# Patient Record
Sex: FEMALE | Race: ASIAN | NOT HISPANIC OR LATINO | Employment: FULL TIME | URBAN - METROPOLITAN AREA
[De-identification: names, ages, dates, MRNs, and addresses within clinical notes are randomized per-mention and may not be internally consistent; named-entity substitution may affect disease eponyms.]

---

## 2017-12-13 ENCOUNTER — OFFICE VISIT (OUTPATIENT)
Dept: PEDIATRICS | Facility: CLINIC | Age: 27
End: 2017-12-13
Payer: COMMERCIAL

## 2017-12-13 VITALS
OXYGEN SATURATION: 98 % | HEIGHT: 61 IN | SYSTOLIC BLOOD PRESSURE: 96 MMHG | BODY MASS INDEX: 26.68 KG/M2 | WEIGHT: 141.3 LBS | TEMPERATURE: 97.7 F | DIASTOLIC BLOOD PRESSURE: 60 MMHG | HEART RATE: 68 BPM

## 2017-12-13 DIAGNOSIS — Z32.01 POSITIVE PREGNANCY TEST: ICD-10-CM

## 2017-12-13 DIAGNOSIS — N91.2 AMENORRHEA: Primary | ICD-10-CM

## 2017-12-13 LAB — BETA HCG QUAL IFA URINE: POSITIVE

## 2017-12-13 PROCEDURE — 84703 CHORIONIC GONADOTROPIN ASSAY: CPT | Performed by: FAMILY MEDICINE

## 2017-12-13 PROCEDURE — 99202 OFFICE O/P NEW SF 15 MIN: CPT | Performed by: FAMILY MEDICINE

## 2017-12-13 ASSESSMENT — PAIN SCALES - GENERAL: PAINLEVEL: NO PAIN (0)

## 2017-12-13 NOTE — PROGRESS NOTES
SUBJECTIVE:                                                    Jaqui Guzman is a 27 year old female who presents to clinic today for the following health issues:      New patient - Previously seen at [facility]  Confirmation of pregnancy-LMP approximately 10/12/17 and positive home pregnancy test  This is a new patient to this provider, who is a  4 para 3, she is here to confirm pregnancy.    Patient has history of irregular menstrual cycles, LMP-2017.  Has no urinary frequency, abdominal pain, breast fullness or tenderness, abnormal vaginal discharge or bleeding, nausea, vomiting.    'Patient does not have underlying medical problems  She has been taking prenatal vitamins for the past 3 months            Problem list and histories reviewed & adjusted, as indicated.  Additional history: as documented    There is no problem list on file for this patient.    History reviewed. No pertinent surgical history.    Social History   Substance Use Topics     Smoking status: Never Smoker     Smokeless tobacco: Never Used     Alcohol use No     History reviewed. No pertinent family history.      Current Outpatient Prescriptions   Medication Sig Dispense Refill     Prenatal Multivit-Min-Fe-FA (PRENATAL VITAMINS PO) Take 1 chew tab by mouth daily       No Known Allergies  No lab results found.   BP Readings from Last 3 Encounters:   17 96/60    Wt Readings from Last 3 Encounters:   17 141 lb 4.8 oz (64.1 kg)                  Labs reviewed in EPIC          ROS:  C: NEGATIVE for fever, chills, change in weight  R: NEGATIVE for significant cough or SOB  CV: NEGATIVE for chest pain, palpitations or peripheral edema  GI: NEGATIVE for nausea, abdominal pain, heartburn, or change in bowel habits  : as above  ENDOCRINE: NEGATIVE for temperature intolerance, skin/hair changes  PSYCHIATRIC: NEGATIVE for changes in mood or affect    OBJECTIVE:     BP 96/60 (BP Location: Right arm, Patient Position: Sitting,  "Cuff Size: Adult Regular)  Pulse 68  Temp 97.7  F (36.5  C) (Oral)  Ht 5' 1\" (1.549 m)  Wt 141 lb 4.8 oz (64.1 kg)  LMP 10/12/2017 (Within Weeks)  SpO2 98%  BMI 26.7 kg/m2  Body mass index is 26.7 kg/(m^2).  GENERAL: healthy, alert and no distress  NECK: no adenopathy, no asymmetry, masses, or scars and thyroid normal to palpation  RESP: lungs clear to auscultation - no rales, rhonchi or wheezes  CV: regular rate and rhythm, normal S1 S2, no S3 or S4, no murmur, click or rub, no peripheral edema and peripheral pulses strong  ABDOMEN: soft, nontender, no hepatosplenomegaly, no masses and bowel sounds normal  MS: no gross musculoskeletal defects noted, no edema  PSYCH: mentation appears normal, affect normal/bright    Diagnostic Test Results:  Results for orders placed or performed in visit on 12/13/17 (from the past 24 hour(s))   Beta HCG qual IFA urine   Result Value Ref Range    Beta HCG Qual IFA Urine Positive (A) NEG^Negative          ASSESSMENT/PLAN:             1. Amenorrhea  Results for orders placed or performed in visit on 12/13/17   Beta HCG qual IFA urine   Result Value Ref Range    Beta HCG Qual IFA Urine Positive (A) NEG^Negative          Reviewed positive pregnancy results with patient  Recommended to continue with prenatal vitamins daily, she will start seeking will be here for ongoing prenatal care given brief prenatal counseling.  Patient verbalised understanding and is agreeable to the plan.      - Beta HCG qual IFA urine - [facility]  - Beta HCG qual IFA urine    2. Positive pregnancy test  as above    - OB/GYN REFERRAL    Chart documentation done in part with Dragon Voice recognition Software. Although reviewed after completion, some word and grammatical error may remain.    See Patient Instructions    Rose Batista MD  [facility]  "

## 2017-12-13 NOTE — NURSING NOTE
"Chief Complaint   Patient presents with     New Patient     Confirmation Of Pregnancy       Initial BP 96/60 (BP Location: Right arm, Patient Position: Sitting, Cuff Size: Adult Regular)  Pulse 68  Temp 97.7  F (36.5  C) (Oral)  Ht 5' 1\" (1.549 m)  Wt 141 lb 4.8 oz (64.1 kg)  LMP 10/12/2017 (Within Weeks)  SpO2 98%  BMI 26.7 kg/m2 Estimated body mass index is 26.7 kg/(m^2) as calculated from the following:    Height as of this encounter: 5' 1\" (1.549 m).    Weight as of this encounter: 141 lb 4.8 oz (64.1 kg).  BP completed using cuff size: regular  Clifford Koo, ARTURO    "

## 2017-12-13 NOTE — MR AVS SNAPSHOT
After Visit Summary   12/13/2017    Jaqui Guzman    MRN: [id number]           Patient Information     Date Of Birth          1990        Visit Information        Provider Department      12/13/2017 11:10 AM Rose Batista MD [facility]        Today's Diagnoses     Amenorrhea    -  1    Positive pregnancy test          Care Instructions    Schedule for OB consult in 2-3 weeks          Follow-ups after your visit        Additional Services     OB/GYN REFERRAL       Your provider has referred you to:  [facility] [facility] [phone]   [email or web address]     Please be aware that coverage of these services is subject to the terms and limitations of your health insurance plan.  Call member services at your health plan with any benefit or coverage questions.      Please bring the following with you to your appointment:    (1) Any X-Rays, CTs or MRIs which have been performed.  Contact the facility where they were done to arrange for  prior to your scheduled appointment.   (2) List of current medications   (3) This referral request   (4) Any documents/labs given to you for this referral                  Who to contact     If you have questions or need follow up information about today's clinic visit or your schedule please contact [facility] [[email or web address]] directly at [phone].  Normal or non-critical lab and imaging results will be communicated to you by MyChart, letter or phone within 4 business days after the clinic has received the results. If you do not hear from us within 7 days, please contact the clinic through MyChart or phone. If you have a critical or abnormal lab result, we will notify you by phone as soon as possible.  Submit refill requests through [employer] or call your pharmacy and they will forward the refill request to us. Please allow 3 business days for your refill to be completed.     "      Additional Information About Your Visit        [employer] Information     Verified Identity Pass is an electronic gateway that provides easy, online access to your medical records. With Verified Identity Pass, you can request a clinic appointment, read your test results, renew a prescription or communicate with your care team.     To sign up for Verified Identity Pass visit the website at [email or web address]   You will be asked to enter the access code listed below, as well as some personal information. Please follow the directions to create your username and password.     Your access code is: [email or web address]  Expires: 3/7/2018 11:30 AM     Your access code will  in 90 days. If you need help or a new code, please contact your [facility] or call [phone] for assistance.        Care EveryWhere ID     This is your Care EveryWhere ID. This could be used by other organizations to access your [facility] medical records  [id number]        Your Vitals Were     Pulse Temperature Height Last Period Pulse Oximetry BMI (Body Mass Index)    68 97.7  F (36.5  C) (Oral) 5' 1\" (1.549 m) 10/12/2017 (Within Weeks) 98% 26.7 kg/m2       Blood Pressure from Last 3 Encounters:   17 96/60    Weight from Last 3 Encounters:   17 141 lb 4.8 oz (64.1 kg)              We Performed the Following     Beta HCG qual IFA urine - [facility] and [place]     Beta HCG qual IFA urine     OB/GYN REFERRAL        Primary Care Provider Fax #    Physician No Ref-Primary [phone]       No address on file        Equal Access to Services     MILVIA GRAHAM : Hadii aad ku hadasho Soomaali, waaxda luqadaha, qaybta kaalmada adeegyada, waxay jamie humphreys . So [facility] [phone].    ATENCIÓN: Si habla español, tiene a pineda disposición servicios gratuitos de asistencia lingüística. Llame al [phone].    We comply with applicable federal civil rights laws and [place] laws. We do not discriminate on the basis of race, color, " national origin, age, disability, sex, sexual orientation, or gender identity.            Thank you!     Thank you for choosing [facility]  for your care. Our goal is always to provide you with excellent care. Hearing back from our patients is one way we can continue to improve our services. Please take a few minutes to complete the written survey that you may receive in the mail after your visit with us. Thank you!             Your Updated Medication List - Protect others around you: Learn how to safely use, store and throw away your medicines at www.disposemymeds.org.          This list is accurate as of: 12/13/17 11:59 AM.  Always use your most recent med list.                   Brand Name Dispense Instructions for use Diagnosis    PRENATAL VITAMINS PO      Take 1 chew tab by mouth daily

## 2018-02-11 ENCOUNTER — HEALTH MAINTENANCE LETTER (OUTPATIENT)
Age: 28
End: 2018-02-11

## 2018-02-15 ENCOUNTER — RADIANT APPOINTMENT (OUTPATIENT)
Dept: ULTRASOUND IMAGING | Facility: CLINIC | Age: 28
End: 2018-02-15
Attending: OBSTETRICS & GYNECOLOGY
Payer: COMMERCIAL

## 2018-02-15 ENCOUNTER — PRENATAL OFFICE VISIT (OUTPATIENT)
Dept: OBGYN | Facility: CLINIC | Age: 28
End: 2018-02-15
Payer: COMMERCIAL

## 2018-02-15 VITALS
TEMPERATURE: 98.6 F | SYSTOLIC BLOOD PRESSURE: 96 MMHG | DIASTOLIC BLOOD PRESSURE: 60 MMHG | HEART RATE: 75 BPM | WEIGHT: 137 LBS | BODY MASS INDEX: 25.86 KG/M2 | HEIGHT: 61 IN

## 2018-02-15 DIAGNOSIS — O09.92 HIGH-RISK PREGNANCY IN SECOND TRIMESTER: ICD-10-CM

## 2018-02-15 DIAGNOSIS — Z12.4 PAP SMEAR FOR CERVICAL CANCER SCREENING: ICD-10-CM

## 2018-02-15 DIAGNOSIS — Z86.718 PERSONAL HISTORY OF DVT (DEEP VEIN THROMBOSIS): Primary | ICD-10-CM

## 2018-02-15 PROCEDURE — G0145 SCR C/V CYTO,THINLAYER,RESCR: HCPCS | Performed by: OBSTETRICS & GYNECOLOGY

## 2018-02-15 PROCEDURE — 76801 OB US < 14 WKS SINGLE FETUS: CPT | Performed by: STUDENT IN AN ORGANIZED HEALTH CARE EDUCATION/TRAINING PROGRAM

## 2018-02-15 PROCEDURE — 99203 OFFICE O/P NEW LOW 30 MIN: CPT | Performed by: OBSTETRICS & GYNECOLOGY

## 2018-02-15 NOTE — MR AVS SNAPSHOT
After Visit Summary   2/15/2018    Jaqui Guzman    MRN: [id number]           Patient Information     Date Of Birth          1990        Visit Information        Provider Department      2/15/2018 10:45 AM Leonardo Echevarria MD [facility]        Today's Diagnoses     Personal history of DVT (deep vein thrombosis)    -  1    High-risk pregnancy in second trimester        Pap smear for cervical cancer screening          Care Instructions    If you have any questions regarding your visit, Please contact your care team.    Women s Health  TELEPHONE NUMBER   HANNAH Higgins-    Norberto Mahan-Medical Assistant      [facility]  [address] [place] [address] [phone] ask for [facility]    Imaging [phone]    [facility]  [address] [place] [address] [address]  [place], [place] [address] [phone]    [address]  [place] [address] [phone] ask for [facility]  Imaging [phone]     Urgent Care locations:    [facility] Monday-Friday  5 pm - 9 pm  Saturday and Sunday   9 am - 5 pm    Monday-Friday   11 am - 9 pm  Saturday and Sunday   9 am - 5 pm   [phone]       If you need a medication refill, please contact your pharmacy. Please allow 3 business days for your refill to be completed.  As always, Thank you for trusting us with your healthcare needs!            Follow-ups after your visit        Your next 10 appointments already scheduled     Feb 15, 2018 12:00 PM CST   US OB < 14 WEEKS SINGLE with MGUS1, MG  TECH   [facility]    [address] [facility] [address] [phone]           Please bring a list of your medicines (including vitamins, minerals and over-the-counter drugs). Also, tell your doctor about any allergies you may have.  "Wear comfortable clothes and leave your valuables at home.  If you re less than 20 weeks drink four 8-ounce glasses of fluid an hour before your exam. If you need to empty your bladder before your exam, try to release only a little urine. Then, drink another glass of fluid.  You may have up to two family members in the exam room. If you bring a small child, an adult must be there to care for him or her.  Please call the Imaging Department at your exam site with any questions.              Future tests that were ordered for you today     Open Future Orders        Priority Expected Expires Ordered    US OB < 14 Weeks Single Routine 2/15/2018 4/1/2018 2/15/2018            Who to contact     If you have questions or need follow up information about today's clinic visit or your schedule please contact [facility] directly at [phone].  Normal or non-critical lab and imaging results will be communicated to you by MyChart, letter or phone within 4 business days after the clinic has received the results. If you do not hear from us within 7 days, please contact the clinic through Fl[employer] or phone. If you have a critical or abnormal lab result, we will notify you by phone as soon as possible.  Submit refill requests through [email or web address] or call your pharmacy and they will forward the refill request to us. Please allow 3 business days for your refill to be completed.          Additional Information About Your Visit        MyChart Information     [email or web address] lets you send messages to your doctor, view your test results, renew your prescriptions, schedule appointments and more. To sign up, go to [email or web address] . Click on \"Log in\" on the left side of the screen, which will take you to the Welcome page. Then click on \"Sign up Now\" on the right side of the page.     You will be asked to enter the access code listed below, as well as some personal information. Please follow the directions to create your " "username and password.     Your access code is: [email or web address]  Expires: 3/7/2018 11:30 AM     Your access code will  in 90 days. If you need help or a new code, please call your [facility] clinic or [phone].        Care EveryWhere ID     This is your Care EveryWhere ID. This could be used by other organizations to access your [place] [facility] medical records  [id number]        Your Vitals Were     Pulse Temperature Height Last Period Breastfeeding? BMI (Body Mass Index)    75 98.6  F (37  C) (Oral) 5' 1\" (1.549 m) 10/12/2017 (Within Weeks) No 25.89 kg/m2       Blood Pressure from Last 3 Encounters:   02/15/18 96/60   17 96/60    Weight from Last 3 Encounters:   02/15/18 137 lb (62.1 kg)   17 141 lb 4.8 oz (64.1 kg)              We Performed the Following     ABO/Rh type and screen     Anti Treponema     CBC with platelets differential     Hepatitis B surface antigen     HIV Antigen Antibody Combo     Pap imaged thin layer screen reflex to HPV if ASCUS - recommend age 25 - 29     Rubella Antibody IgG Quantitative     Urine Culture Aerobic Bacterial        Primary Care Provider Office Phone # Fax #    Rose Batista [phone] [address]  [facility] [address]        Equal Access to Services     ASHANTI GRAHAM : Hadii aad ku hadasho Soomaali, waaxda luqadaha, qaybta kaalmada adeegyada, pasha humphreys . So [facility] [place] [facility] [phone].    ATENCIÓN: Si habla español, tiene a pineda disposición servicios gratuitos de asistencia lingüística. Llame al [phone].    We comply with applicable federal civil rights laws and [place] laws. We do not discriminate on the basis of race, color, national origin, age, disability, sex, sexual orientation, or gender identity.            Thank you!     Thank you for choosing [facility]  for your care. Our goal is always to provide you with excellent care. Hearing back from our patients is one way we can " continue to improve our services. Please take a few minutes to complete the written survey that you may receive in the mail after your visit with us. Thank you!             Your Updated Medication List - Protect others around you: Learn how to safely use, store and throw away your medicines at www.disposemymeds.org.          This list is accurate as of 2/15/18 11:57 AM.  Always use your most recent med list.                   Brand Name Dispense Instructions for use Diagnosis    PRENATAL VITAMINS PO      Take 1 chew tab by mouth daily

## 2018-02-15 NOTE — NURSING NOTE
"Chief Complaint   Patient presents with     Prenatal Care     1st initial OB visit       Initial BP 96/60 (BP Location: Right arm, Patient Position: Chair, Cuff Size: Adult Regular)  Pulse 75  Temp 98.6  F (37  C) (Oral)  Ht 5' 1\" (1.549 m)  Wt 137 lb (62.1 kg)  LMP 10/12/2017 (Within Weeks)  Breastfeeding? No  BMI 25.89 kg/m2 Estimated body mass index is 25.89 kg/(m^2) as calculated from the following:    Height as of this encounter: 5' 1\" (1.549 m).    Weight as of this encounter: 137 lb (62.1 kg).  Medication Reconciliation: complete   Vinita Baez CMA      "

## 2018-02-15 NOTE — LETTER
February 23, 2018      Jaqui Guzman  [address]  [place] [address]    Dear ,      I am happy to inform you that your recent cervical cancer screening test (PAP smear) was normal.      Preventative screenings such as this help to ensure your health for years to come. You should repeat a pap smear in 3 years, unless otherwise directed.      You will still need to return to the clinic every year for your annual exam and other preventive tests.     Please contact the clinic at [phone] if you have further questions.       Sincerely,      Leonardo Echevarria MD/[facility]

## 2018-02-15 NOTE — PROGRESS NOTES
INITIAL OB ASSESSMENT............................................Date: 2/15/2018                            ---------------------    Name: Jaqui Guzman.......................................................................Plan Number: [id number]    Age: 27 year old   : 1990  Phone: [phone] (home)   Address: [address] [place] [address]    Marital Status:    Race/Ethnicity:   Occupation:     Partner's Name:  Rob Potter, Partner's Occupation:  Senior health care in [place]     OB Physician: Leonardo Echevarria       LMP:  Patient's LMP from OB Dating Form:  Patient's last menstrual period was 10/12/2017 (within weeks).  Regular menses? yes  Menses every month.   Length of menses: 7 days    Obstetrical History  ===================  Obstetric History       T3      L3     SAB0   TAB0   Ectopic0   Multiple0   Live Births3       # Outcome Date GA Lbr Christoph/2nd Weight Sex Delivery Anes PTL Lv   4 Current            3 Term         DAVID   2 Term         DAVID   1 Term         DAVID          Past Medical History:  Past Medical History:   Diagnosis Date     DVT (deep vein thrombosis) in pregnancy (H) 2015    right leg       Past Surgical History:  Past Surgical History:   Procedure Laterality Date     APPENDECTOMY         Current Outpatient Prescriptions   Medication Sig Dispense Refill     Prenatal Multivit-Min-Fe-FA (PRENATAL VITAMINS PO) Take 1 chew tab by mouth daily         No Known Allergies    Social History     Social History     Marital status:      Spouse name: N/A     Number of children: N/A     Years of education: N/A     Occupational History     Not on file.     Social History Main Topics     Smoking status: Never Smoker     Smokeless tobacco: Never Used     Alcohol use No     Drug use: No     Sexual activity: Yes     Partners: Male     Other Topics Concern     Parent/Sibling W/ Cabg, Mi Or Angioplasty  "Before 65f 55m? No     Social History Narrative     , Children  No substance abuse, environmental exposures, mental health risks and No financial concerns,pets. Partner support.       Family History   Problem Relation Age of Onset     Breast Cancer No family hx of      Colon Cancer No family hx of      Hypertension No family hx of      DIABETES No family hx of        Past Medical History of Father of Baby:   No significant medical history     No known genetic disease in patient's 1st or 2nd degree relatives  No known genetic diseases in the FOB's 1st or 2nd degree relatives    REVIEW OF SYMPTOMS:   History Since Last Menstrual Period:    nausea, vomiting and breast tenderness     PHYSICAL EXAM:  BP 96/60 (BP Location: Right arm, Patient Position: Chair, Cuff Size: Adult Regular)  Pulse 75  Temp 98.6  F (37  C) (Oral)  Ht 5' 1\" (1.549 m)  Wt 137 lb (62.1 kg)  LMP 10/12/2017 (Within Weeks)  Breastfeeding? No  BMI 25.89 kg/m2  General:  WNWD female, NAD  Oriented:  X 3  Alert  PSYCH:  mentation appears normal., affect and mood normal  HEENT:  NC/AT, EOMI  NECK:  Neck supple. No adenopathy. Thyroid symmetric, normal size,, Carotids without bruits.  HEART:  RRR  LUNGS:  Clear to auscultation.  Good respiratory effort  BREASTS: declined   ABDOMEN: Benign, Soft, flat, non-tender, No masses, organomegaly and Bowel sounds normoactive FHT's heard  VULVA: no masses or lesions seen  BUS:  Bartholin's, Urethra, Doral's normal  VAGINA:  No masses or lesions seen.   CERVIX:  Parous, closed, mobile, no discharge  UTERUS:  Normal shape, position and consistency and Nontender; 14-16 week size  ADNEXA:  No masses palpated, non-tender  EXTREMITIES:No cyanosis, clubbing, warm and well perfused and No edema   GAIT: normal including tandem walk, heel and toe walk.        Assessment:   IUP at 18 weeks, 14-16 week size     Plan:  Schedule ultrasound for the size less than dates.  She also is not sure on the LMP.    She has " history of DVT in past pregnancy in left leg.  She was non compliant with Lovenox during the pregnancy.   Recommend to see [facility] for this.    She is also planning on going to [email or web address] next month.  She should visit the [facility] for [facility].  I had attempted to discuss this with her further, including Zeka virus, but I don't feel she understood what I was attempting to say.  [facility] are given to her.  She is to call and set up appointment.    Options for  testing for chromosomal and birth defects were discussed with the patient.  Diagnostic tests include CVS and Amniocentesis.  We discussed that these tests are definitive but invasive and do carry a risk of fetal loss.    Screening tests include nuchal translucency/blood marker testing in the first trimester and quad screening in the second trimester.  We discussed that these are screening tests and not diagnostic tests and that false positives and negatives are a distinct possibility.  The patient will think about and decide.  We discussed physician coverage, tertiary support, diet, exercise, weight gain, schedule of visits, routine and indicated ultrasounds, and childbirth education.   Labs done today  RTC 4 weeks    Leonardo Echevarria MD

## 2018-02-15 NOTE — PATIENT INSTRUCTIONS
If you have any questions regarding your visit, Please contact your care team.    Women s Health  TELEPHONE NUMBER   HANNAH Higgins-    Norberto Mahan-Medical Assistant      [facility]  [address] [place], [place] [address] [place] [address]  [phone] ask for [facility]    Imaging [phone]    [facility]  [address] [place] [address]  [phone]    [facility]  [address]  [place] [address]  [phone] ask for [facility]  Imaging [phone]     Urgent Care locations:    [facility] Monday-Friday  5 pm - 9 pm  Saturday and Sunday   9 am - 5 pm    Monday-Friday   11 am - 9 pm  Saturday and Sunday   9 am - 5 pm   [phone]       If you need a medication refill, please contact your pharmacy. Please allow 3 business days for your refill to be completed.  As always, Thank you for trusting us with your healthcare needs!

## 2018-02-20 LAB
COPATH REPORT: NORMAL
PAP: NORMAL

## 2018-02-22 ENCOUNTER — TELEPHONE (OUTPATIENT)
Dept: OBGYN | Facility: CLINIC | Age: 28
End: 2018-02-22

## 2018-02-22 DIAGNOSIS — Z86.718 PERSONAL HISTORY OF DVT (DEEP VEIN THROMBOSIS): Primary | ICD-10-CM

## 2018-02-22 DIAGNOSIS — O09.90 HIGH-RISK PREGNANCY, UNSPECIFIED TRIMESTER: ICD-10-CM

## 2018-02-22 NOTE — TELEPHONE ENCOUNTER
MFM RN called stating they had an appt held for patient on 02-26-18 and have tried 3 times to contact patient to schedule an appt. Patient has not returned their calls. [facility] does recommend patient be put on Lovenox. MFM RN wanted Dr. Echevarria to know that SHAYLEE would be happy to see patient if she calls back to schedule. Explained patient's next OB appt is on 03-27-18 with Dr. Salinas.     Attempted to reach patient on cell phone. Left messages on both cell & home phone asking pt to call back to clinic about a message that we are trying to schedule for her. Left clinic call back phone number and RN hours.     Will route to Dr. Echevarria as an update. Bianca Fsiher RN, BAN

## 2018-02-23 NOTE — TELEPHONE ENCOUNTER
Wale, Leonardo Bueno MD   [facility] Patient Care 21 hours ago (5:16 PM)                 Prescription sent to [facility] pharmacy.     Patient is to start, per M.  (Routing comment)               Phone call to patient it was very difficult to understand pt. Her voice was very muffled in the phone and could only hear parts of conversation. Requested several times to have patient speak clearly into the phone. Explained [facility] is attempting to reach her to schedule appt per Dr. Echevarria's referral orders. Gave patient phone number and she will call. Patient is not currently on Lovenox and has not been since 2016. Explained per [facility] & Dr. Echevarria patient needs to take med. Verified pharmacy. Sent per Dr. Echevarria's orders.  Patient stated she remembered how to give herself injections and will start med. Bianca Fishre RN, BAN

## 2018-04-06 ENCOUNTER — PRENATAL OFFICE VISIT (OUTPATIENT)
Dept: OBGYN | Facility: CLINIC | Age: 28
End: 2018-04-06
Payer: COMMERCIAL

## 2018-04-06 ENCOUNTER — MEDICAL CORRESPONDENCE (OUTPATIENT)
Dept: HEALTH INFORMATION MANAGEMENT | Facility: CLINIC | Age: 28
End: 2018-04-06

## 2018-04-06 ENCOUNTER — RADIANT APPOINTMENT (OUTPATIENT)
Dept: ULTRASOUND IMAGING | Facility: CLINIC | Age: 28
End: 2018-04-06
Attending: OBSTETRICS & GYNECOLOGY
Payer: COMMERCIAL

## 2018-04-06 VITALS
OXYGEN SATURATION: 97 % | HEART RATE: 69 BPM | WEIGHT: 144.9 LBS | DIASTOLIC BLOOD PRESSURE: 59 MMHG | BODY MASS INDEX: 27.38 KG/M2 | SYSTOLIC BLOOD PRESSURE: 91 MMHG

## 2018-04-06 DIAGNOSIS — Z34.82 ENCOUNTER FOR SUPERVISION OF OTHER NORMAL PREGNANCY, SECOND TRIMESTER: Primary | ICD-10-CM

## 2018-04-06 DIAGNOSIS — O09.92 HIGH-RISK PREGNANCY IN SECOND TRIMESTER: ICD-10-CM

## 2018-04-06 DIAGNOSIS — Z86.718 PERSONAL HISTORY OF DVT (DEEP VEIN THROMBOSIS): ICD-10-CM

## 2018-04-06 DIAGNOSIS — O09.899 SUPERVISION OF OTHER HIGH RISK PREGNANCY, ANTEPARTUM: ICD-10-CM

## 2018-04-06 DIAGNOSIS — O09.899 SUPERVISION OF OTHER HIGH RISK PREGNANCY, ANTEPARTUM: Primary | ICD-10-CM

## 2018-04-06 LAB
ABO + RH BLD: NORMAL
ABO + RH BLD: NORMAL
BACTERIA SPEC CULT: NO GROWTH
BASOPHILS # BLD AUTO: 0 10E9/L (ref 0–0.2)
BASOPHILS NFR BLD AUTO: 0.3 %
BLD GP AB SCN SERPL QL: NORMAL
BLOOD BANK CMNT PATIENT-IMP: NORMAL
DIFFERENTIAL METHOD BLD: ABNORMAL
EOSINOPHIL # BLD AUTO: 0.1 10E9/L (ref 0–0.7)
EOSINOPHIL NFR BLD AUTO: 0.7 %
ERYTHROCYTE [DISTWIDTH] IN BLOOD BY AUTOMATED COUNT: 13.1 % (ref 10–15)
HCT VFR BLD AUTO: 34.7 % (ref 35–47)
HGB BLD-MCNC: 11.3 G/DL (ref 11.7–15.7)
IMM GRANULOCYTES # BLD: 0.1 10E9/L (ref 0–0.4)
IMM GRANULOCYTES NFR BLD: 1.2 %
LYMPHOCYTES # BLD AUTO: 2 10E9/L (ref 0.8–5.3)
LYMPHOCYTES NFR BLD AUTO: 17.6 %
MCH RBC QN AUTO: 32.4 PG (ref 26.5–33)
MCHC RBC AUTO-ENTMCNC: 32.6 G/DL (ref 31.5–36.5)
MCV RBC AUTO: 99 FL (ref 78–100)
MONOCYTES # BLD AUTO: 0.7 10E9/L (ref 0–1.3)
MONOCYTES NFR BLD AUTO: 5.7 %
NEUTROPHILS # BLD AUTO: 8.6 10E9/L (ref 1.6–8.3)
NEUTROPHILS NFR BLD AUTO: 74.5 %
PLATELET # BLD AUTO: 236 10E9/L (ref 150–450)
RADIOLOGIST FLAGS: NORMAL
RBC # BLD AUTO: 3.49 10E12/L (ref 3.8–5.2)
SPECIMEN EXP DATE BLD: NORMAL
SPECIMEN SOURCE: NORMAL
WBC # BLD AUTO: 11.6 10E9/L (ref 4–11)

## 2018-04-06 PROCEDURE — 85025 COMPLETE CBC W/AUTO DIFF WBC: CPT | Performed by: OBSTETRICS & GYNECOLOGY

## 2018-04-06 PROCEDURE — 86780 TREPONEMA PALLIDUM: CPT | Performed by: OBSTETRICS & GYNECOLOGY

## 2018-04-06 PROCEDURE — 87389 HIV-1 AG W/HIV-1&-2 AB AG IA: CPT | Performed by: OBSTETRICS & GYNECOLOGY

## 2018-04-06 PROCEDURE — 86901 BLOOD TYPING SEROLOGIC RH(D): CPT | Performed by: OBSTETRICS & GYNECOLOGY

## 2018-04-06 PROCEDURE — 76805 OB US >/= 14 WKS SNGL FETUS: CPT | Performed by: STUDENT IN AN ORGANIZED HEALTH CARE EDUCATION/TRAINING PROGRAM

## 2018-04-06 PROCEDURE — 87086 URINE CULTURE/COLONY COUNT: CPT | Performed by: OBSTETRICS & GYNECOLOGY

## 2018-04-06 PROCEDURE — 87340 HEPATITIS B SURFACE AG IA: CPT | Performed by: OBSTETRICS & GYNECOLOGY

## 2018-04-06 PROCEDURE — 36415 COLL VENOUS BLD VENIPUNCTURE: CPT | Performed by: OBSTETRICS & GYNECOLOGY

## 2018-04-06 PROCEDURE — 86762 RUBELLA ANTIBODY: CPT | Performed by: OBSTETRICS & GYNECOLOGY

## 2018-04-06 PROCEDURE — 99212 OFFICE O/P EST SF 10 MIN: CPT | Performed by: OBSTETRICS & GYNECOLOGY

## 2018-04-06 PROCEDURE — 86900 BLOOD TYPING SEROLOGIC ABO: CPT | Performed by: OBSTETRICS & GYNECOLOGY

## 2018-04-06 PROCEDURE — 86850 RBC ANTIBODY SCREEN: CPT | Performed by: OBSTETRICS & GYNECOLOGY

## 2018-04-06 NOTE — PATIENT INSTRUCTIONS
If you have any questions regarding your visit, Please contact your care team.    Women s Health CLINIC HOURS TELEPHONE NUMBER   Lisa Gonzalez DO.    ART[place] [place] -    LIZZETTE Valenzuela       Monday, Wednesday, Thursday and Friday, [place]  8:30a.m-5:00 p.m   [facility]  [address] [place] [address]  [phone] ask for [facility]    Imaging [phone]       Urgent Care locations:    [facility] Saturday and Sunday   9 am - 5 pm    Monday-Friday   12 pm - 8 pm  Saturday and Sunday   9 am - 5 pm   [phone]     [facility] Labor and Delivery:  [phone]    If you need a medication refill, please contact your pharmacy. Please allow 3 business days for your refill to be completed.  As always, Thank you for trusting us with your healthcare needs!

## 2018-04-06 NOTE — PROGRESS NOTES
She reports feeling reassuring daily fetal activity and will continue to record.  She gained 7 lbs over the past 6 weeks and denies any fluid leakage or regular uterine contractions.  Due to her hx of a DVT (right leg) during her previous pregnancy, she was prescribed Lovenox 60 mg BID.  However, today the pt admits that she never picked up the script so has not started taking this.  She also failed to make an appt with MFM for f/u so the importance of this was stressed today and she agreed to follow through.  A new referral to perinatology was placed.  [place] so she states that she did not have a 20-week screening OB US but still wants this done.  Therefore, an appt for an OB US was made for this afternoon.

## 2018-04-06 NOTE — MR AVS SNAPSHOT
After Visit Summary   4/6/2018    Jaqui Guzman    MRN: [id number]           Patient Information     Date Of Birth          1990        Visit Information        Provider Department      4/6/2018 11:30 AM Lisa Gonzalez DO [facility]        Today's Diagnoses     High-risk pregnancy in second trimester          Care Instructions                                                         If you have any questions regarding your visit, Please contact your care team.    [facility] CLINIC HOURS TELEPHONE NUMBER   Lisa Gonzalez DO.    ART[facility] [place] -    LIZZETTE Valenzuela       Monday, Wednesday, Thursday and Friday, [place]  8:30a.m-5:00 p.m   [facility]  [address] [place] [address]  [phone] ask for [facility]    Imaging [phone]       Urgent Care locations:    [facility] Saturday and Sunday   9 am - 5 pm    Monday-Friday   12 pm - 8 pm  Saturday and Sunday   9 am - 5 pm   [phone]     [facility] Labor and Delivery:  [phone]    If you need a medication refill, please contact your pharmacy. Please allow 3 business days for your refill to be completed.  As always, Thank you for trusting us with your healthcare needs!                Follow-ups after your visit        Your next 10 appointments already scheduled     May 04, 2018 10:20 AM CDT   LAB with LAB FIRST FLOOR [facility] ([facility])    [address] [facility] [id number] [phone]           Please do not eat 10-12 hours before your appointment if you are coming in fasting for labs on lipids, cholesterol, or glucose (sugar). This does not apply to pregnant women. Water, hot tea and black coffee (with nothing added) are okay. Do not drink other fluids, diet soda or chew gum.            May 04, 2018 10:30 AM CDT   ESTABLISHED PRENATAL  "with Lisa Alaniz Carlos, DO   [facility] ([facility]    [address] [place] [id number] [phone]              Who to contact     If you have questions or need follow up information about today's clinic visit or your schedule please contact [facility] directly at [phone].  Normal or non-critical lab and imaging results will be communicated to you by MyChart, letter or phone within 4 business days after the clinic has received the results. If you do not hear from us within 7 days, please contact the clinic through MDxHealthhart or phone. If you have a critical or abnormal lab result, we will notify you by phone as soon as possible.  Submit refill requests through [employer] Vault or call your pharmacy and they will forward the refill request to us. Please allow 3 business days for your refill to be completed.          Additional Information About Your Visit        MDxHealthharCooltech Applications Information     Digital Vault lets you send messages to your doctor, view your test results, renew your prescriptions, schedule appointments and more. To sign up, go to [email or web address] . Click on \"Log in\" on the left side of the screen, which will take you to the Welcome page. Then click on \"Sign up Now\" on the right side of the page.     You will be asked to enter the access code listed below, as well as some personal information. Please follow the directions to create your username and password.     Your access code is: [email or web address]  Expires: 2018 11:59 AM     Your access code will  in 90 days. If you need help or a new code, please call your [place] clinic or [phone].        Care EveryWhere ID     This is your Care EveryWhere ID. This could be used by other organizations to access your [place] medical records  [id number]        Your Vitals Were     Pulse Last Period Pulse Oximetry Breastfeeding? BMI (Body Mass Index)       69 10/12/2017 (Within Weeks) 97% No " 27.38 kg/m2        Blood Pressure from Last 3 Encounters:   04/06/18 91/59   02/15/18 96/60   12/13/17 96/60    Weight from Last 3 Encounters:   04/06/18 144 lb 14.4 oz (65.7 kg)   02/15/18 137 lb (62.1 kg)   12/13/17 141 lb 4.8 oz (64.1 kg)              We Performed the Following     **HIV Antigen Antibody Combo FUTURE anytime     ABO/Rh type and screen     Anti Treponema     CBC with platelets and differential     Hepatitis B surface antigen     Rubella Antibody IgG Quantitative     Urine Culture Aerobic Bacterial        Primary Care Provider Office Phone # Fax #    Rose Batista [phone] [address]  [facility] [address]        Equal Access to Services     MILVIA GRAHAM : Hadii kaushik mao hadasho Soomaali, waaxda luqadaha, qaybta kaalmada adeegyada, pasha ayala hayishan humphreys . So [facility] [place] [facility] [phone].    ATENCIÓN: Si habla español, tiene a pineda disposición servicios gratuitos de asistencia lingüística. Llame al [phone].    We comply with applicable federal civil rights laws and [place] laws. We do not discriminate on the basis of race, color, national origin, age, disability, sex, sexual orientation, or gender identity.            Thank you!     Thank you for choosing [facility]  for your care. Our goal is always to provide you with excellent care. Hearing back from our patients is one way we can continue to improve our services. Please take a few minutes to complete the written survey that you may receive in the mail after your visit with us. Thank you!             Your Updated Medication List - Protect others around you: Learn how to safely use, store and throw away your medicines at www.disposemymeds.org.          This list is accurate as of 4/6/18 11:59 AM.  Always use your most recent med list.                   Brand Name Dispense Instructions for use Diagnosis    enoxaparin 60 MG/0.6ML injection    LOVENOX    60 Syringe    Inject 0.6 mLs (60  mg) Subcutaneous 2 times daily    Personal history of DVT (deep vein thrombosis), High-risk pregnancy, unspecified trimester       PRENATAL VITAMINS PO      Take 1 chew tab by mouth daily

## 2018-04-07 LAB — T PALLIDUM IGG+IGM SER QL: NEGATIVE

## 2018-04-09 LAB
HBV SURFACE AG SERPL QL IA: NONREACTIVE
HIV 1+2 AB+HIV1 P24 AG SERPL QL IA: NONREACTIVE
RUBV IGG SERPL IA-ACNC: 39 IU/ML

## 2018-04-10 ENCOUNTER — RADIANT APPOINTMENT (OUTPATIENT)
Dept: ULTRASOUND IMAGING | Facility: CLINIC | Age: 28
End: 2018-04-10
Attending: OBSTETRICS & GYNECOLOGY
Payer: COMMERCIAL

## 2018-04-10 DIAGNOSIS — Z34.82 ENCOUNTER FOR SUPERVISION OF OTHER NORMAL PREGNANCY, SECOND TRIMESTER: ICD-10-CM

## 2018-04-10 PROCEDURE — 76816 OB US FOLLOW-UP PER FETUS: CPT | Performed by: STUDENT IN AN ORGANIZED HEALTH CARE EDUCATION/TRAINING PROGRAM

## 2018-04-17 ENCOUNTER — TELEPHONE (OUTPATIENT)
Dept: OBGYN | Facility: CLINIC | Age: 28
End: 2018-04-17

## 2018-04-17 NOTE — TELEPHONE ENCOUNTER
Phone call from [facility] stating that Jaqui did NO SHOW for her ultrasound today. They were going to try and reach her again. Dr Manzano also noted that Jaqui should be on Lovenox 40 mg once a day and not the 60 mg BID. She also stated that the patient is not taking the Lovenox currently.    Will forward this to Dr. Gonzalez for FYI. [facility] will also send over the notes.    Stephany Sánchez RN

## 2018-04-20 ENCOUNTER — TELEPHONE (OUTPATIENT)
Dept: OBGYN | Facility: CLINIC | Age: 28
End: 2018-04-20

## 2018-04-20 DIAGNOSIS — Z86.718 PERSONAL HISTORY OF DVT (DEEP VEIN THROMBOSIS): Primary | ICD-10-CM

## 2018-04-20 NOTE — TELEPHONE ENCOUNTER
I called and left a message on her cell phone regarding the need to switch from 60 mg to 40 mg of Lovenox SQ daily.  She is to call back and confirm that she has received this message.  This was advised by SHAYLEE - please see Dr. Manzano's letter from 4/17/18.

## 2018-05-04 ENCOUNTER — PRENATAL OFFICE VISIT (OUTPATIENT)
Dept: OBGYN | Facility: CLINIC | Age: 28
End: 2018-05-04
Payer: COMMERCIAL

## 2018-05-04 ENCOUNTER — TELEPHONE (OUTPATIENT)
Dept: OBGYN | Facility: CLINIC | Age: 28
End: 2018-05-04

## 2018-05-04 VITALS
BODY MASS INDEX: 28.47 KG/M2 | DIASTOLIC BLOOD PRESSURE: 59 MMHG | WEIGHT: 150.7 LBS | HEART RATE: 91 BPM | SYSTOLIC BLOOD PRESSURE: 92 MMHG | OXYGEN SATURATION: 100 %

## 2018-05-04 DIAGNOSIS — Z34.82 ENCOUNTER FOR SUPERVISION OF OTHER NORMAL PREGNANCY, SECOND TRIMESTER: ICD-10-CM

## 2018-05-04 DIAGNOSIS — Z86.718 PERSONAL HISTORY OF DVT (DEEP VEIN THROMBOSIS): Primary | ICD-10-CM

## 2018-05-04 DIAGNOSIS — O09.899 SUPERVISION OF OTHER HIGH RISK PREGNANCY, ANTEPARTUM: ICD-10-CM

## 2018-05-04 LAB
GLUCOSE 1H P 50 G GLC PO SERPL-MCNC: 95 MG/DL (ref 60–129)
HGB BLD-MCNC: 11.2 G/DL (ref 11.7–15.7)

## 2018-05-04 PROCEDURE — 36415 COLL VENOUS BLD VENIPUNCTURE: CPT | Performed by: OBSTETRICS & GYNECOLOGY

## 2018-05-04 PROCEDURE — 82950 GLUCOSE TEST: CPT | Performed by: OBSTETRICS & GYNECOLOGY

## 2018-05-04 PROCEDURE — 99212 OFFICE O/P EST SF 10 MIN: CPT | Performed by: OBSTETRICS & GYNECOLOGY

## 2018-05-04 PROCEDURE — 00000218 ZZHCL STATISTIC OBHBG - HEMOGLOBIN: Performed by: OBSTETRICS & GYNECOLOGY

## 2018-05-04 NOTE — PATIENT INSTRUCTIONS
If you have any questions regarding your visit, Please contact your care team.    Women s Health CLINIC HOURS TELEPHONE NUMBER   Lisa Gonzalez DO.    [facility] [place] -    LIZZETTE Valenzuela       Monday, Wednesday, Thursday and Friday, [place]  8:30a.m-5:00 p.m   [facility]  [address] [place] [address]  [phone] ask for [facility]    Imaging [phone]       Urgent Care locations:    [facility] Saturday and Sunday   9 am - 5 pm    Monday-Friday   12 pm - 8 pm  Saturday and Sunday   9 am - 5 pm   [phone]     [facility] Labor and Delivery:  [phone]    If you need a medication refill, please contact your pharmacy. Please allow 3 business days for your refill to be completed.  As always, Thank you for trusting us with your healthcare needs!

## 2018-05-04 NOTE — MR AVS SNAPSHOT
After Visit Summary   5/4/2018    Jaqui Guzman    MRN: [id number]           Patient Information     Date Of Birth          1990        Visit Information        Provider Department      5/4/2018 10:30 AM Lisa Gonzalez DO [facility]        Care Instructions                                                         If you have any questions regarding your visit, Please contact your care team.    Women s Health CLINIC HOURS TELEPHONE NUMBER   Lisa Gonzalez DO.    ART[facility] [place] -    LZIZETTE Valenzuela       [facility], [facility]  8:30a.m-5:00 p.m   [facility]  [address] [place] [address]  [phone] ask for [facility]    Imaging [phone]       Urgent Care locations:    [facility] Saturday and Sunday   9 am - 5 pm    Monday-Friday   12 pm - 8 pm  Saturday and Sunday   9 am - 5 pm   [phone]     [facility] Labor and Delivery:  [phone]    If you need a medication refill, please contact your pharmacy. Please allow 3 business days for your refill to be completed.  As always, Thank you for trusting us with your healthcare needs!                Follow-ups after your visit        Who to contact     If you have questions or need follow up information about today's clinic visit or your schedule please contact [facility] directly at [phone].  Normal or non-critical lab and imaging results will be communicated to you by MyChart, letter or phone within 4 business days after the clinic has received the results. If you do not hear from us within 7 days, please contact the clinic through MyChart or phone. If you have a critical or abnormal lab result, we will notify you by phone as soon as possible.  Submit refill requests through [employer] or call your pharmacy and they will forward the refill request to us. Please  "allow 3 business days for your refill to be completed.          Additional Information About Your Visit        MyChart Information     Portafarehart lets you send messages to your doctor, view your test results, renew your prescriptions, schedule appointments and more. To sign up, go to [email or web address] . Click on \"Log in\" on the left side of the screen, which will take you to the Welcome page. Then click on \"Sign up Now\" on the right side of the page.     You will be asked to enter the access code listed below, as well as some personal information. Please follow the directions to create your username and password.     Your access code is: [email or web address]  Expires: 2018 11:59 AM     Your access code will  in 90 days. If you need help or a new code, please call your [place] clinic or [phone].        Care EveryWhere ID     This is your Care EveryWhere ID. This could be used by other organizations to access your [place] medical records  [id number]        Your Vitals Were     Pulse Last Period Pulse Oximetry Breastfeeding? BMI (Body Mass Index)       91 10/12/2017 (Within Weeks) 100% No 28.47 kg/m2        Blood Pressure from Last 3 Encounters:   18 92/59   18 91/59   02/15/18 96/60    Weight from Last 3 Encounters:   18 150 lb 11.2 oz (68.4 kg)   18 144 lb 14.4 oz (65.7 kg)   02/15/18 137 lb (62.1 kg)              Today, you had the following     No orders found for display       Primary Care Provider Office Phone # Fax #    Rose Batista [phone]       [address]  [facility] [address]        Equal Access to Services     MILVIA GRAHAM : Sony Gomez, caroline astorga, renard vázquezalpasha powell. So [facility] [phone].    ATENCIÓN: Si habla español, tiene a pineda disposición servicios gratuitos de asistencia lingüística. Llmoises al [phone].    We comply with applicable federal civil rights laws " and [place] laws. We do not discriminate on the basis of race, color, national origin, age, disability, sex, sexual orientation, or gender identity.            Thank you!     Thank you for choosing [facility]  for your care. Our goal is always to provide you with excellent care. Hearing back from our patients is one way we can continue to improve our services. Please take a few minutes to complete the written survey that you may receive in the mail after your visit with us. Thank you!             Your Updated Medication List - Protect others around you: Learn how to safely use, store and throw away your medicines at www.disposemymeds.org.          This list is accurate as of 5/4/18 11:29 AM.  Always use your most recent med list.                   Brand Name Dispense Instructions for use Diagnosis    * enoxaparin 60 MG/0.6ML injection    LOVENOX    60 Syringe    Inject 0.6 mLs (60 mg) Subcutaneous 2 times daily    Personal history of DVT (deep vein thrombosis), High-risk pregnancy, unspecified trimester       * enoxaparin 40 MG/0.4ML injection    LOVENOX    14 Syringe    Inject 0.4 mLs (40 mg) Subcutaneous daily    Personal history of DVT (deep vein thrombosis)       PRENATAL VITAMINS PO      Take 1 chew tab by mouth daily        * Notice:  This list has 2 medication(s) that are the same as other medications prescribed for you. Read the directions carefully, and ask your doctor or other care provider to review them with you.

## 2018-05-04 NOTE — TELEPHONE ENCOUNTER
Notes Recorded by Lisa Gonzalez DO on 5/4/2018 at 1:46 PM  Please tell her that her glucose value was normal but hgb was mildly decreased so she needs to take one extra iron tab daily po - FeSO4 325 mg     left message asking pt to call back to clinic for information from doctor. Left clinic call back phone number and RN hours. Bianca Fisher RN, BAN

## 2018-05-04 NOTE — PROGRESS NOTES
"She reports feeling reassuring daily fetal activity and will continue to record.  She gained 6 lbs since her last visit and denies any fluid leakage or regular uterine contractions.  We discussed the fact that she still has not started taking her Lovenox.  She just returned from a long flight to and from [facility] and was not on Lovenox the entire time.  In reply, she stated that she \"kwew that her body is healthy and so does not need to take Lovenox.\"  Will refer to [facility] for follow up and she agreed to have a [facility] interpretor present since I don't feel that she understands everything that is said.  The results of her 2 USs on 4/6/18 and 4/10/18 were reviewed with the pt.  "

## 2018-05-08 ENCOUNTER — TRANSFERRED RECORDS (OUTPATIENT)
Dept: HEALTH INFORMATION MANAGEMENT | Facility: CLINIC | Age: 28
End: 2018-05-08

## 2020-09-29 ENCOUNTER — ALLIED HEALTH/NURSE VISIT (OUTPATIENT)
Dept: PEDIATRICS | Facility: CLINIC | Age: 30
End: 2020-09-29

## 2020-09-29 DIAGNOSIS — Z23 NEED FOR PROPHYLACTIC VACCINATION AND INOCULATION AGAINST INFLUENZA: Primary | ICD-10-CM

## 2020-09-29 PROCEDURE — 99207 ZZC NO CHARGE NURSE ONLY: CPT | Performed by: INTERNAL MEDICINE

## 2020-09-29 PROCEDURE — 90686 IIV4 VACC NO PRSV 0.5 ML IM: CPT | Performed by: INTERNAL MEDICINE

## 2020-09-29 PROCEDURE — 90471 IMMUNIZATION ADMIN: CPT | Performed by: INTERNAL MEDICINE

## 2021-10-20 ENCOUNTER — PRENATAL OFFICE VISIT (OUTPATIENT)
Dept: OBGYN | Facility: CLINIC | Age: 31
End: 2021-10-20
Payer: MEDICAID

## 2021-10-20 VITALS
TEMPERATURE: 97.7 F | SYSTOLIC BLOOD PRESSURE: 105 MMHG | HEART RATE: 67 BPM | BODY MASS INDEX: 29.18 KG/M2 | DIASTOLIC BLOOD PRESSURE: 68 MMHG | WEIGHT: 154.44 LBS

## 2021-10-20 DIAGNOSIS — Z23 NEED FOR TDAP VACCINATION: ICD-10-CM

## 2021-10-20 DIAGNOSIS — O09.291 HIGH RISK PREGNANCY DUE TO HISTORY OF PREVIOUS OBSTETRICAL PROBLEM IN FIRST TRIMESTER: Primary | ICD-10-CM

## 2021-10-20 PROBLEM — Z86.718 PERSONAL HISTORY OF DVT (DEEP VEIN THROMBOSIS): Status: RESOLVED | Noted: 2018-02-15 | Resolved: 2021-10-20

## 2021-10-20 PROCEDURE — 99203 OFFICE O/P NEW LOW 30 MIN: CPT | Performed by: ADVANCED PRACTICE MIDWIFE

## 2021-10-20 ASSESSMENT — ANXIETY QUESTIONNAIRES
6. BECOMING EASILY ANNOYED OR IRRITABLE: NOT AT ALL
IF YOU CHECKED OFF ANY PROBLEMS ON THIS QUESTIONNAIRE, HOW DIFFICULT HAVE THESE PROBLEMS MADE IT FOR YOU TO DO YOUR WORK, TAKE CARE OF THINGS AT HOME, OR GET ALONG WITH OTHER PEOPLE: NOT DIFFICULT AT ALL
7. FEELING AFRAID AS IF SOMETHING AWFUL MIGHT HAPPEN: NOT AT ALL
GAD7 TOTAL SCORE: 0
3. WORRYING TOO MUCH ABOUT DIFFERENT THINGS: NOT AT ALL
2. NOT BEING ABLE TO STOP OR CONTROL WORRYING: NOT AT ALL
5. BEING SO RESTLESS THAT IT IS HARD TO SIT STILL: NOT AT ALL
1. FEELING NERVOUS, ANXIOUS, OR ON EDGE: NOT AT ALL

## 2021-10-20 ASSESSMENT — PATIENT HEALTH QUESTIONNAIRE - PHQ9
5. POOR APPETITE OR OVEREATING: NOT AT ALL
SUM OF ALL RESPONSES TO PHQ QUESTIONS 1-9: 1

## 2021-10-20 NOTE — PROGRESS NOTES
Jaqui LUGO Megan is a 31 year old  who presents to the clinic for an new ob visit.   Estimated Date of Delivery: May 13, 2022 is calculated from Patient's last menstrual period was 2021 (approximate).   Is here today to get a pregnancy confirmation letter.  Isn't interested in doing labs today because she doesn't want to get a bill for it.  Labs ordered future as well as ultrasound and will complete when she has insurance.   Will also need GC/CT and pap at her next visit.        She has not had bleeding since her LMP.   She has had mild nausea. Has lost about 6 lbs.  Throws up about once a day in the evening.  Doesn't want medication for this  HISTORY  updated and reviewed  Past Medical History:   Diagnosis Date     DVT (deep vein thrombosis) in pregnancy 2015    right leg     Past Surgical History:   Procedure Laterality Date     APPENDECTOMY       Social History     Socioeconomic History     Marital status:      Spouse name: Not on file     Number of children: Not on file     Years of education: Not on file     Highest education level: Not on file   Occupational History     Not on file   Tobacco Use     Smoking status: Never Smoker     Smokeless tobacco: Never Used   Substance and Sexual Activity     Alcohol use: No     Drug use: No     Sexual activity: Yes     Partners: Male     Birth control/protection: None   Other Topics Concern     Parent/sibling w/ CABG, MI or angioplasty before 65F 55M? No   Social History Narrative     Not on file     Social Determinants of Health     Financial Resource Strain:      Difficulty of Paying Living Expenses:    Food Insecurity:      Worried About Running Out of Food in the Last Year:      Ran Out of Food in the Last Year:    Transportation Needs:      Lack of Transportation (Medical):      Lack of Transportation (Non-Medical):    Physical Activity:      Days of Exercise per Week:      Minutes of Exercise per Session:    Stress:      Feeling of Stress :     Social Connections:      Frequency of Communication with Friends and Family:      Frequency of Social Gatherings with Friends and Family:      Attends Temple Services:      Active Member of Clubs or Organizations:      Attends Club or Organization Meetings:      Marital Status:    Intimate Partner Violence:      Fear of Current or Ex-Partner:      Emotionally Abused:      Physically Abused:      Sexually Abused:      Health Maintenance   Topic Date Due     PREVENTIVE CARE VISIT  Never done     ADVANCE CARE PLANNING  Never done     HEPATITIS C SCREENING  Never done     PHQ-2  01/01/2021     PAP  02/15/2021     INFLUENZA VACCINE (1) 09/01/2021     DTAP/TDAP/TD IMMUNIZATION (3 - Td or Tdap) 06/27/2031     HIV SCREENING  Completed     COVID-19 Vaccine  Completed     Pneumococcal Vaccine: Pediatrics (0 to 5 Years) and At-Risk Patients (6 to 64 Years)  Aged Out     IPV IMMUNIZATION  Aged Out     MENINGITIS IMMUNIZATION  Aged Out     HEPATITIS B IMMUNIZATION  Aged Out     Family History   Problem Relation Age of Onset     Breast Cancer No family hx of      Colon Cancer No family hx of      Hypertension No family hx of      Diabetes No family hx of             ROS: 10 point ROS neg other than the symptoms noted above in the HPI.      PHYSICAL EXAM  /68 (BP Location: Right arm, Patient Position: Sitting, Cuff Size: Adult Regular)   Pulse 67   Temp 97.7  F (36.5  C) (Tympanic)   Wt 70.1 kg (154 lb 7 oz)   LMP 08/06/2021 (Approximate)   BMI 29.18 kg/m    GENERAL:  Pleasant pregnant female, alert, cooperative  and well groomed.  SKIN:  Warm and dry, without lesions or rashes  HEAD: Symmetrical features.  EYES:  PERRLA.  EARS: Tympanic membranes gray, translucent and intact.  NECK:  Thyroid without enlargement and nodules.  Lymph nodes not palpable.   LUNGS:  Clear to auscultation.  BREAST:  Symmetrical.  No dominant, fixed or suspicious masses are noted.  No skin or nipple changes or axillary nodes.    Nipples  everted.      HEART:  RRR with  out murmur.  ABDOMEN: Soft without masses , tenderness or organomegaly.  No CVA tenderness.  Uterus not palpable   Well healed scar from appendectomy.  MUSCULOSKELETAL:  Full range of motion    EXTREMITIES:  No edema. No significant varicosities.    ASSESSMENT:  Intrauterine pregnancy at 10w 4 d by LMP  (O09.291) High risk pregnancy due to history of previous obstetrical problem in first trimester  (primary encounter diagnosis)  Comment:   Plan: ABO/Rh type and screen, CBC with Platelets &         Differential, Hepatitis B surface antigen, HIV         Antigen Antibody Combo, Rubella Antibody IgG,         Treponema Abs w Reflex to RPR and Titer, UA         reflex to Microscopic, Urine Culture, Hepatitis        C antibody, US OB < 14 Weeks Single            (Z23) Need for Tdap vaccination  Comment:   Plan:       PLAN:  Options for  testing for chromosomal and birth defects were discussed with the patient. Diagnostic tests include CVS and Amniocentesis. We discussed that these tests are definitive but invasive and do carry a risk of fetal loss.   Screening tests include nuchal translucency/blood marker testing in the first trimester and quad screening in the second trimester. We discussed that these are screening tests and not diagnostic tests and that false positives and negatives are a distinct possibility.  Declines  testing.    Instructed on best evidence for: weight gain for her weight and height for pregnancy; healthy diet and foods to avoid; exercise and activity during pregnancy;avoiding exposure to toxoplasmosis; and maintenance of a generally healthy lifestyle.     Intended hospital for birth is [facility].    Reviewed transmission of and avoidance strategies for CMV.  Patient does not meet criteria for low dose Asprin therapy.   Had about three visits with her last pregnancy up to 20 weeks.  Declined to use Lovenox to prevent blood clots with that pregnancy.   Uncertain if she will do it this time.  Clear that she would not fill a prescription until or unless she has insurance coverage.   Knows to maintain hydration and activity to prevent the formation of clots.   Next visit with MD to discuss

## 2021-10-20 NOTE — LETTER
October 20, 2021    To Whom It May Concern:    Jaqui Guzman is being seen in our clinic for prenatal care.      Her Estimated Date of Delivery: May 13, 2022.    The first day the third trimester: 2/18/2022    LMP:  Patient's last menstrual period was 08/06/2021 (approximate).     Sincerely,        Simona Wagoner, LILY, CNM

## 2021-10-20 NOTE — PATIENT INSTRUCTIONS
Thank for choosing our clinic for your health care needs. Our goal is to provided you with excellent care. One way that we continue to improve our care is by listening to our patients. Please take a few minutes to complete the written survey that you may receive in the mail after your visit.     You may reach your care team by calling the following number:    Leanna Stack- [phone]   For clinic hours at [facility]  please visit the [employer] web site [email or web address]    Notification of your lab results:  Normal or non-critical lab and imaging results will be communicated to you by Mychart, letter, or phone within 7 days. If you do not hear from us within 10 days, please contact us through [facility] [facility] or phone. If you have a critical or abnormal lab result, we will notify you by phone as soon as possible.  Pap smears often take a bit longer.       Medication Refills:  Please contact your pharmacy and they will forward the refill to us. Please allow 3 business days for your refills to be completed.     Secure access to your medical record:  Use [employer] (secure email communication and access to your chart) to send your primary care provider a message or make an appointment. Ask someone on your Team how to sign up for [employer]. To log on to Arav or for more information in [employer] please visit the website at [email or web address].            How to prevent CMV or cytomegalovirus infection while you are pregnant:    Thoroughly wash your hands with soap and warm water especially after doing things such as:  Diaper changes  Feeding or bathing a child  Wiping a child's runny nose or drool  Handling a child's toys    Do not share cups, plates, utensils, toothbrushes or food with your children  Do not kiss young children on the mouth or cheek. Instead, kiss them on the head or give them a hug.  Do not share towels or washcloths with young children.  Clean toy, counter tops, and other surfaces that come in  contact with urine or saliva.        LISTERIA  Individuals can reduce the risk for listeria contamination through proper food selection, handling, and storage, such as:    Rinsing raw produce (fuits and vegetables) before eating, cutting, or cooking;    Keeping refrigerators at 40 degrees F or lower;    Buying soft cheeses only if their labels state that they are made with pasteurized milk.  Also avoiding cheese that has not been initially wrapped in plastic.  These cheeses include brie, camembert, blue and the soft Mexican cheeses like con queso.    Heating all food that can be heated but especially hot dogs, luncheon meats, and cold cuts to an internal temperature of 165 degrees F or until steaming hot before serving them; and    Washing your hands for at least 20 seconds with warm water and soap before and after handling cantaloupes or other melons.    Watch for food recalls for listeria and contact us if you believe you have been exposed.               First line remedies for nausea in pregnancy    Eat small frequent meals every 2-3 hours if possible.   Avoid food at extremes of temparture and drinks with carbination.  Eat foods that appeal to you, avoiding fats and spicy foods.  Bread, pasta, crackers, potatoes, and rice tend to be tolerated the best.  Don't worry about what you eat in the first 3 months, it is more important that you can eat and keep it down.   Try flat ginger ale.  Ginger is a herbal remedy for nausea and you can use it in any form.  There are ginger tablets you can purchase.  The dose is 500 to 1000 mg a day.   You may also try doxylamine 12.5 mg three times a day which is a sleeping medication along with Vitamin B6 25 mg three times a day.  This combination takes up to a week to work so give it some time.  Be sure to take both the doxylamine and B6  Doxylamine is sometimes called Unisom and it comes in 25 mg tablets so you will have to break it in half and take half a tablet.   It is  important to take the Unisom and B6 together or it won't be effective.  If you begin to vomit more than 5 or 6 times a day and feel that you are unable to keep anything down, call the clinic for an appointment to be seen.                Fruits and vegetables high in pesticide contamination  Strawberries  Spinach  Kale  Nectarines  Peaches  Apples  Grapes  Cherries  Pears  Tomatoes  Celery  Potatoes  Hot Peppers.     Consider extra washing of these fruits and vegetables, peeling if possible or purchasing organics.     Fruits and vegetables lowest if pesticide contramination:  Avocado  Sweet corn  Pineapple  Cabbage   Onions   Frozen peas  Papaya  Asparagus  Mushrooms  Eggplant  Honeydew  Kiwi  Cantaloupe  Cauliflower  Broccoli      Consider eliminating or reducing the following additives in personal care products and make up:  Triclosan  Paraben  Phthalates  Phenols  Products that do not contain these additives are often found in the organic or green sections of stores.

## 2021-10-21 ASSESSMENT — ANXIETY QUESTIONNAIRES: GAD7 TOTAL SCORE: 0

## 2021-11-24 ENCOUNTER — PRENATAL OFFICE VISIT (OUTPATIENT)
Dept: OBGYN | Facility: CLINIC | Age: 31
End: 2021-11-24
Payer: MEDICAID

## 2021-11-24 VITALS
BODY MASS INDEX: 28.64 KG/M2 | WEIGHT: 151.56 LBS | SYSTOLIC BLOOD PRESSURE: 93 MMHG | TEMPERATURE: 97.6 F | DIASTOLIC BLOOD PRESSURE: 63 MMHG | HEART RATE: 75 BPM

## 2021-11-24 DIAGNOSIS — O09.291 HIGH RISK PREGNANCY DUE TO HISTORY OF PREVIOUS OBSTETRICAL PROBLEM IN FIRST TRIMESTER: ICD-10-CM

## 2021-11-24 LAB
ABO/RH(D): NORMAL
ALBUMIN UR-MCNC: NEGATIVE MG/DL
ANTIBODY SCREEN: NEGATIVE
APPEARANCE UR: CLEAR
BACTERIA #/AREA URNS HPF: ABNORMAL /HPF
BASOPHILS # BLD AUTO: 0 10E3/UL (ref 0–0.2)
BASOPHILS NFR BLD AUTO: 0 %
BILIRUB UR QL STRIP: NEGATIVE
COLOR UR AUTO: ABNORMAL
EOSINOPHIL # BLD AUTO: 0.2 10E3/UL (ref 0–0.7)
EOSINOPHIL NFR BLD AUTO: 2 %
ERYTHROCYTE [DISTWIDTH] IN BLOOD BY AUTOMATED COUNT: 12.4 % (ref 10–15)
GLUCOSE UR STRIP-MCNC: NEGATIVE MG/DL
HBV SURFACE AG SERPL QL IA: NONREACTIVE
HCT VFR BLD AUTO: 33 % (ref 35–47)
HCV AB SERPL QL IA: NONREACTIVE
HGB BLD-MCNC: 11.3 G/DL (ref 11.7–15.7)
HGB UR QL STRIP: NEGATIVE
HIV 1+2 AB+HIV1 P24 AG SERPL QL IA: NONREACTIVE
IMM GRANULOCYTES # BLD: 0.1 10E3/UL
IMM GRANULOCYTES NFR BLD: 1 %
KETONES UR STRIP-MCNC: NEGATIVE MG/DL
LEUKOCYTE ESTERASE UR QL STRIP: ABNORMAL
LYMPHOCYTES # BLD AUTO: 1.6 10E3/UL (ref 0.8–5.3)
LYMPHOCYTES NFR BLD AUTO: 15 %
MCH RBC QN AUTO: 32.1 PG (ref 26.5–33)
MCHC RBC AUTO-ENTMCNC: 34.2 G/DL (ref 31.5–36.5)
MCV RBC AUTO: 94 FL (ref 78–100)
MONOCYTES # BLD AUTO: 0.5 10E3/UL (ref 0–1.3)
MONOCYTES NFR BLD AUTO: 5 %
NEUTROPHILS # BLD AUTO: 8.2 10E3/UL (ref 1.6–8.3)
NEUTROPHILS NFR BLD AUTO: 77 %
NITRATE UR QL: NEGATIVE
NRBC # BLD AUTO: 0 10E3/UL
NRBC BLD AUTO-RTO: 0 /100
PH UR STRIP: 7 [PH] (ref 5–7)
PLATELET # BLD AUTO: 238 10E3/UL (ref 150–450)
RBC # BLD AUTO: 3.52 10E6/UL (ref 3.8–5.2)
RBC #/AREA URNS AUTO: ABNORMAL /HPF
RUBV IGG SERPL QL IA: 4.3 INDEX
RUBV IGG SERPL QL IA: POSITIVE
SP GR UR STRIP: 1.01 (ref 1–1.03)
SPECIMEN EXPIRATION DATE: NORMAL
SQUAMOUS #/AREA URNS AUTO: ABNORMAL /LPF
T PALLIDUM AB SER QL: NONREACTIVE
UROBILINOGEN UR STRIP-MCNC: NORMAL MG/DL
WBC # BLD AUTO: 10.6 10E3/UL (ref 4–11)
WBC #/AREA URNS AUTO: ABNORMAL /HPF

## 2021-11-24 PROCEDURE — 99212 OFFICE O/P EST SF 10 MIN: CPT | Performed by: ADVANCED PRACTICE MIDWIFE

## 2021-11-24 PROCEDURE — 86780 TREPONEMA PALLIDUM: CPT | Performed by: ADVANCED PRACTICE MIDWIFE

## 2021-11-24 PROCEDURE — 81001 URINALYSIS AUTO W/SCOPE: CPT | Performed by: ADVANCED PRACTICE MIDWIFE

## 2021-11-24 PROCEDURE — 87340 HEPATITIS B SURFACE AG IA: CPT | Performed by: ADVANCED PRACTICE MIDWIFE

## 2021-11-24 PROCEDURE — 36415 COLL VENOUS BLD VENIPUNCTURE: CPT | Performed by: ADVANCED PRACTICE MIDWIFE

## 2021-11-24 PROCEDURE — 86803 HEPATITIS C AB TEST: CPT | Performed by: ADVANCED PRACTICE MIDWIFE

## 2021-11-24 PROCEDURE — 87389 HIV-1 AG W/HIV-1&-2 AB AG IA: CPT | Performed by: ADVANCED PRACTICE MIDWIFE

## 2021-11-24 PROCEDURE — 86762 RUBELLA ANTIBODY: CPT | Performed by: ADVANCED PRACTICE MIDWIFE

## 2021-11-24 PROCEDURE — 86850 RBC ANTIBODY SCREEN: CPT | Performed by: ADVANCED PRACTICE MIDWIFE

## 2021-11-24 PROCEDURE — 85025 COMPLETE CBC W/AUTO DIFF WBC: CPT | Performed by: ADVANCED PRACTICE MIDWIFE

## 2021-11-24 PROCEDURE — 86900 BLOOD TYPING SEROLOGIC ABO: CPT | Performed by: ADVANCED PRACTICE MIDWIFE

## 2021-11-24 PROCEDURE — 86901 BLOOD TYPING SEROLOGIC RH(D): CPT | Performed by: ADVANCED PRACTICE MIDWIFE

## 2021-11-24 PROCEDURE — 87086 URINE CULTURE/COLONY COUNT: CPT | Performed by: ADVANCED PRACTICE MIDWIFE

## 2021-11-25 LAB — BACTERIA UR CULT: NO GROWTH

## 2021-12-02 ENCOUNTER — PRENATAL OFFICE VISIT (OUTPATIENT)
Dept: OBGYN | Facility: CLINIC | Age: 31
End: 2021-12-02
Payer: MEDICAID

## 2021-12-02 ENCOUNTER — TELEPHONE (OUTPATIENT)
Dept: OBGYN | Facility: CLINIC | Age: 31
End: 2021-12-02

## 2021-12-02 ENCOUNTER — MEDICAL CORRESPONDENCE (OUTPATIENT)
Dept: HEALTH INFORMATION MANAGEMENT | Facility: CLINIC | Age: 31
End: 2021-12-02

## 2021-12-02 VITALS
DIASTOLIC BLOOD PRESSURE: 59 MMHG | HEART RATE: 74 BPM | OXYGEN SATURATION: 98 % | BODY MASS INDEX: 28.49 KG/M2 | WEIGHT: 150.8 LBS | SYSTOLIC BLOOD PRESSURE: 93 MMHG

## 2021-12-02 DIAGNOSIS — O09.291 HIGH RISK PREGNANCY DUE TO HISTORY OF PREVIOUS OBSTETRICAL PROBLEM IN FIRST TRIMESTER: Primary | ICD-10-CM

## 2021-12-02 PROCEDURE — 99212 OFFICE O/P EST SF 10 MIN: CPT | Performed by: OBSTETRICS & GYNECOLOGY

## 2021-12-02 NOTE — PATIENT INSTRUCTIONS
If you have any questions regarding your visit, Please contact your care team.  [facility]: [phone]  Women s Health CLINIC HOURS TELEPHONE NUMBER   Cephas Agbeh, M.D. Becky-RN Kylie-RN Heidi-RN Deanna-MA Angela-  Miya-         Monday-Mark    8:00a.m-4:45 p.m    Tuesday--[place]     8:00a.m-4:45 p.m.    Thursday-Mark    8:00a.m-4:45 p.m.    Friday-Mark    8:00a.m-4:45 p.m    [facility]   [address]   [place] [address]   [phone]   Fax [phone]   Fxuwhqi-[phone]     [facility]   [address] [place] [address] [place] [address]   [phone]    [facility]  [address] [facility] [address]  [phone]  Kuacgiu-[phone]   Urgent Care locations:    [facility] Monday-Friday  10 am - 8 pm  Saturday and Sunday   9 am - 5 pm   Monday-Friday   10 am- 8 pm  Saturday and Sunday  9 am - 5 pm    [phone]   If you need a medication refill, please contact your pharmacy. Please allow 3 business days for your refill to be completed.  As always, Thank you for trusting us with your healthcare needs!

## 2021-12-02 NOTE — PROGRESS NOTES
16w6d. Doing well . H/) DVT with 3rd pregnancy and had Lovenox during pregnancy. She is declining at this time. She accepts  [facility] referral.  Routine anticipatory guidance.   return to clinic in 4 weeks.    ICD-10-CM    1. High risk pregnancy due to history of previous obstetrical problem in first trimester  O09.291 Perinatology Referral

## 2021-12-20 ENCOUNTER — TELEPHONE (OUTPATIENT)
Dept: OBGYN | Facility: OTHER | Age: 31
End: 2021-12-20
Payer: MEDICAID

## 2021-12-20 ENCOUNTER — TRANSFERRED RECORDS (OUTPATIENT)
Dept: HEALTH INFORMATION MANAGEMENT | Facility: CLINIC | Age: 31
End: 2021-12-20
Payer: MEDICAID

## 2021-12-20 NOTE — TELEPHONE ENCOUNTER
Gabriel from [facility] called stating pt has elected to go on prophylactic lovenox for hx of DVT. Gabriel states [facility] report will be sent over soon, but wanted to give a heads up to provdier as well.    RN routing to provider as FYI for lovenox order once report comes through.    Vibha Munroe RN on 12/20/2021 at 2:17 PM

## 2021-12-20 NOTE — TELEPHONE ENCOUNTER
I notice that she is scheduled with me, she really needs to be scheduled with one of the MDs to do the lovenox.  Her appointment is on the 29th, so hopefully there will be enough time to reschedule her.   Thanks   Simona

## 2021-12-21 NOTE — TELEPHONE ENCOUNTER
Unable to reach patient via phone. RN left a message and instructed patient to call the clinic at [phone].    Please reschedule appt on 12/29/2021 with an MD or DO.    Vibha Munroe RN on 12/21/2021 at 7:29 AM

## 2021-12-22 ENCOUNTER — TELEPHONE (OUTPATIENT)
Dept: OBGYN | Facility: CLINIC | Age: 31
End: 2021-12-22
Payer: MEDICAID

## 2021-12-22 NOTE — TELEPHONE ENCOUNTER
[facility]    Phone Message    May a detailed message be left on voicemail: yes     Reason for Call: Magui Bermudez from [facility] at the [facility] is requesting a call back.  She wants to know if we can draw some labs while Pt is here on 12/29.  Thanks.    Action Taken: Message routed to:  [facility] p [phone]    Travel Screening: Not Applicable

## 2021-12-22 NOTE — TELEPHONE ENCOUNTER
Returned call to [facility].  Ok to draw.   Simona Wagoner, APRN, CNM

## 2021-12-22 NOTE — TELEPHONE ENCOUNTER
Left message to return call. Openings in [place] with Dr Paulson on 12/27    Katie Sims MA

## 2021-12-23 ENCOUNTER — TELEPHONE (OUTPATIENT)
Dept: OBGYN | Facility: OTHER | Age: 31
End: 2021-12-23
Payer: MEDICAID

## 2021-12-23 ENCOUNTER — APPOINTMENT (OUTPATIENT)
Dept: INTERPRETER SERVICES | Facility: CLINIC | Age: 31
End: 2021-12-23
Payer: MEDICAID

## 2021-12-23 DIAGNOSIS — O09.291 HIGH RISK PREGNANCY DUE TO HISTORY OF PREVIOUS OBSTETRICAL PROBLEM IN FIRST TRIMESTER: Primary | ICD-10-CM

## 2021-12-23 NOTE — TELEPHONE ENCOUNTER
Attempt to call pt x3 to reschedule and LM, no callback yet.    Vibha Munroe RN on 12/23/2021 at 10:44 AM

## 2021-12-23 NOTE — TELEPHONE ENCOUNTER
Unable to reach patient via phone. RN left a message using  services and instructed patient to call the clinic at [phone].    Vibha Munroe RN on 12/23/2021 at 10:45 AM

## 2021-12-23 NOTE — TELEPHONE ENCOUNTER
RN received call from Kaela at [facility] asking if pt can have queued lab orders below drawn at upcoming appt as lab at [facility] was unable to draw them due to confusion.    Following labs queued:  -factor 5 Leiden  -prothrombin factor 2  -protein c activity  -lupus anticoagulant panel  -beta 2 glucoprotein IGM IGG    RN routing to providers to review/sign.    Vibha Munroe RN on 12/23/2021 at 10:21 AM

## 2021-12-23 NOTE — TELEPHONE ENCOUNTER
I asked this pt to be rescheduled with one of the docs.  She needs to start lovenox and I won't do that.  She is still scheduled on the 29th with me.   Thanks  holli

## 2021-12-28 NOTE — TELEPHONE ENCOUNTER
RN called pt again using  services and assisted in rescheduling appt with Dr. Carlos RN routing to Dr. Gonzalez now for advisement on labs queued below that were requested by M to have done.    Vibha Munroe RN on 12/28/2021 at 10:36 AM

## 2021-12-29 DIAGNOSIS — O09.92 SUPERVISION OF HIGH RISK PREGNANCY IN SECOND TRIMESTER: Primary | ICD-10-CM

## 2021-12-29 NOTE — TELEPHONE ENCOUNTER
Carlos, Lisa Alaniz, Vibha Ibarra RN 1 hour ago (1:19 PM)     MM    I called in the Lovenox script but the pt needs instructions on this and should not wait until next week's clinic appt.     Spoke with pt via [employer] .  Pt has given herself Lovenox injections in the past during pregnancy.  Asked if she wanted a refresher and she said she was comfortable with doing it.  I did talk to her about giving herself injections and it sounds like she is comfortable with it and understands what needs to be done.  Pt will  Lovenox rx tomorrow, as she is not able to get there tonight, and start tomorrow.  She knows she can call us if she has any questions.  I also suggested she bring one of her prefilled syringes to her prenatal appt on 1/3 so we can watch her give herself an injection and she did not feel like this was needed.    Routing back to Dr. Gonzalez to sign lab orders pended per request from [facility] to have done at her prenatal appt on 1/3.    Sydnie Zhao, LIZZETTE

## 2021-12-31 ENCOUNTER — TELEPHONE (OUTPATIENT)
Dept: OBGYN | Facility: CLINIC | Age: 31
End: 2021-12-31
Payer: MEDICAID

## 2021-12-31 NOTE — TELEPHONE ENCOUNTER
I called [facility] and gave the information over the phone for Lovenox 40 mg/0.4 ml syringe subcutaneously daily, #30 syringes with 2 refills since the program in the medical chart does not allow for this.

## 2021-12-31 NOTE — TELEPHONE ENCOUNTER
This patient called stating that she picked up a prescription for blood clots and it was only 1 shot. She thought it was supposed to be a 30 day supply.     She is requesting to have another prescription sent in to her pharmacy ([facility] in [place] on [address]).    Thank you

## 2022-01-03 ENCOUNTER — PRENATAL OFFICE VISIT (OUTPATIENT)
Dept: OBGYN | Facility: CLINIC | Age: 32
End: 2022-01-03
Payer: MEDICAID

## 2022-01-03 VITALS
OXYGEN SATURATION: 99 % | BODY MASS INDEX: 28.89 KG/M2 | WEIGHT: 152.9 LBS | HEART RATE: 62 BPM | DIASTOLIC BLOOD PRESSURE: 57 MMHG | SYSTOLIC BLOOD PRESSURE: 92 MMHG

## 2022-01-03 DIAGNOSIS — O09.92 SUPERVISION OF HIGH RISK PREGNANCY IN SECOND TRIMESTER: Primary | ICD-10-CM

## 2022-01-03 PROCEDURE — 99207 PR PRENATAL VISIT: CPT | Performed by: OBSTETRICS & GYNECOLOGY

## 2022-01-19 ENCOUNTER — TELEPHONE (OUTPATIENT)
Dept: OBGYN | Facility: CLINIC | Age: 32
End: 2022-01-19
Payer: COMMERCIAL

## 2022-01-19 ENCOUNTER — APPOINTMENT (OUTPATIENT)
Dept: INTERPRETER SERVICES | Facility: CLINIC | Age: 32
End: 2022-01-19
Payer: COMMERCIAL

## 2022-01-19 NOTE — TELEPHONE ENCOUNTER
Spoke with Kaela from [facility] who states she has noticed that pt has not had the labs drawn yet that they requested Dr. Gonzalez order.  The lab orders are placed in pt's chart but are still in there as future.    Pt's last prenatal appt was on 1/3/22.  No future appt scheduled.  Will call pt to assist her in scheduling her next prenatal appt and then will put in appt notes to send to labs for lab orders requested from [facility].  Kaela at [facility] will continue to check back to see if these have been done.    Attempted to reach pt to assist her in scheduling a prenatal visit at the end of January.  [facility] via [employer]  to call clinic back.    Sydnie Zhao RN

## 2022-01-20 ENCOUNTER — MYC MEDICAL ADVICE (OUTPATIENT)
Dept: OBGYN | Facility: CLINIC | Age: 32
End: 2022-01-20
Payer: COMMERCIAL

## 2022-01-20 DIAGNOSIS — O09.92 SUPERVISION OF HIGH RISK PREGNANCY IN SECOND TRIMESTER: ICD-10-CM

## 2022-01-20 NOTE — TELEPHONE ENCOUNTER
Sending pt a [employer] message as well since we have not been able to reach her by phone and she is not calling us back.  She does need a [employer]  so she may not be able to read the [employer] message but I will still try.    Sydnie Zhao RN

## 2022-01-20 NOTE — TELEPHONE ENCOUNTER
Attempted to reach pt again via [employer] .  Unable to reach patient via phone. Left message to call clinic back at [phone].    Sydnie Zhao RN

## 2022-01-21 NOTE — TELEPHONE ENCOUNTER
Finally able to reach pt with a [facility] .  Scheduled her next prenatal visit with LILY Monsivais CNM, on 2/2, and put in appt notes to send to lab to get future lab orders placed by Dr. Gonzalez as suggested by SHAYLEE.    Sydnie Zhao RN

## 2022-01-22 ENCOUNTER — HEALTH MAINTENANCE LETTER (OUTPATIENT)
Age: 32
End: 2022-01-22

## 2022-02-02 ENCOUNTER — PRENATAL OFFICE VISIT (OUTPATIENT)
Dept: OBGYN | Facility: CLINIC | Age: 32
End: 2022-02-02
Payer: COMMERCIAL

## 2022-02-02 VITALS
WEIGHT: 157.19 LBS | DIASTOLIC BLOOD PRESSURE: 60 MMHG | HEART RATE: 81 BPM | BODY MASS INDEX: 29.7 KG/M2 | SYSTOLIC BLOOD PRESSURE: 102 MMHG

## 2022-02-02 DIAGNOSIS — O09.92 SUPERVISION OF HIGH RISK PREGNANCY IN SECOND TRIMESTER: Primary | ICD-10-CM

## 2022-02-02 DIAGNOSIS — Z86.718 PERSONAL HISTORY OF DVT (DEEP VEIN THROMBOSIS): ICD-10-CM

## 2022-02-02 LAB
GLUCOSE 1H P 50 G GLC PO SERPL-MCNC: 141 MG/DL (ref 70–129)
HGB BLD-MCNC: 10.7 G/DL (ref 11.7–15.7)

## 2022-02-02 PROCEDURE — 99207 PR PRENATAL VISIT: CPT | Performed by: ADVANCED PRACTICE MIDWIFE

## 2022-02-02 PROCEDURE — 82950 GLUCOSE TEST: CPT | Performed by: ADVANCED PRACTICE MIDWIFE

## 2022-02-02 PROCEDURE — 36415 COLL VENOUS BLD VENIPUNCTURE: CPT | Performed by: ADVANCED PRACTICE MIDWIFE

## 2022-02-02 NOTE — PROGRESS NOTES
Feeling well other than perhaps round ligament pain.   Some sciatic pain.  Discussed relief measures.   Would consider PT if no relief  Breast feeding and wants pump will check with insurance  Doing GCT today .  Has had flu and COVID vaccines.   Doing Lovenox.   No contra/lof/vb  RTC 4 weeks.   Simona Wagoner, LILY, CNM

## 2022-04-08 ENCOUNTER — TELEPHONE (OUTPATIENT)
Dept: OBGYN | Facility: OTHER | Age: 32
End: 2022-04-08
Payer: COMMERCIAL

## 2022-04-08 NOTE — TELEPHONE ENCOUNTER
Assisted pt in scheduling, states provider needed to cancel appt.  Since pt has not been seen since 2/2/22 for prenatal care, RN assisted in scheduling next available triage opening for Monday.     t has questions about next ultrasound, states had one already but wants to know when next one is to be done.  After ending pt call, RN see's an ultrasound ordered but not completed.  RN attempted to call pt back left message to check her [employer] message.      ROSA FrankN RN

## 2022-04-11 ENCOUNTER — PRENATAL OFFICE VISIT (OUTPATIENT)
Dept: OBGYN | Facility: CLINIC | Age: 32
End: 2022-04-11
Payer: COMMERCIAL

## 2022-04-11 VITALS
HEART RATE: 92 BPM | WEIGHT: 168.4 LBS | SYSTOLIC BLOOD PRESSURE: 111 MMHG | OXYGEN SATURATION: 95 % | DIASTOLIC BLOOD PRESSURE: 71 MMHG | BODY MASS INDEX: 31.82 KG/M2

## 2022-04-11 DIAGNOSIS — Z86.718 PERSONAL HISTORY OF DVT (DEEP VEIN THROMBOSIS): ICD-10-CM

## 2022-04-11 DIAGNOSIS — Z34.83 ENCOUNTER FOR SUPERVISION OF OTHER NORMAL PREGNANCY, THIRD TRIMESTER: Primary | ICD-10-CM

## 2022-04-11 PROCEDURE — 87653 STREP B DNA AMP PROBE: CPT | Performed by: OBSTETRICS & GYNECOLOGY

## 2022-04-11 PROCEDURE — 99207 PR PRENATAL VISIT: CPT | Performed by: OBSTETRICS & GYNECOLOGY

## 2022-04-11 NOTE — PROGRESS NOTES
"She reports feeling reassuring daily fetal activity and will continue to record.  She gained 11 lbs since her last visit and we discussed eating a healthier diet since she admits to eating \"too much.\"  She needs a refill of Lovenox so a script was sent to her listed pharmacy.  She still has not scheduled her 3-hour GTT and was advised that she needs to get this done this week.  Instructions on this test were provided to the patient and she was walked up to the  to ensure scheduling.  SHAYLEE's note from 12/20/21 has been reviewed and IOL is advised at 39 weeks gestation with discontinuation of Lovenox 12 hours prior.  She is to then restart Lovenox x 6 weeks postpartum.  She declines the Tdap vaccine today even though it was advised.  She requested a breast pump but I advised that she obtain this script in the hospital after delivery.  We discussed her plans for future pregnancy and she wants to retry for a girl so declines sterilization/PPTL.    "

## 2022-04-11 NOTE — PATIENT INSTRUCTIONS
If you have any questions regarding your visit, Please contact your care team.    [facility]: [phone]      [facility] CLINIC HOURS TELEPHONE NUMBER   Lisa Gonzalez .    PASTOR [place] -  Miya -       Sydnie RN  [place], RN  Vibha, LIZZETTE     [place]  Wednesday and Friday  8:30 a.m-5:00 p.m    [place]-Temporary  Monday 8:30 a.m-5:00 p.m [facility]  [address] [place] [address]  [phone] ask for [facility]    Imaging [phone]    [facility]  [address] [facility] [place] [address]     Urgent Care locations:  [facility] Saturday and Sunday   9 am - 5 pm    Monday-Friday   10 am - 8 pm  Saturday and Sunday   9 am - 5 pm   [phone]     [facility] Labor and Delivery:  [phone]    If you need a medication refill, please contact your pharmacy. Please allow 3 business days for your refill to be completed.  As always, Thank you for trusting us with your healthcare needs!   __________see additional instructions from your care team below___________

## 2022-04-13 LAB — GP B STREP DNA SPEC QL NAA+PROBE: NEGATIVE

## 2022-04-18 ENCOUNTER — LAB (OUTPATIENT)
Dept: LAB | Facility: CLINIC | Age: 32
End: 2022-04-18
Payer: COMMERCIAL

## 2022-04-18 DIAGNOSIS — O24.410 DIET CONTROLLED GESTATIONAL DIABETES MELLITUS (GDM) IN THIRD TRIMESTER: Primary | ICD-10-CM

## 2022-04-18 DIAGNOSIS — O09.92 SUPERVISION OF HIGH RISK PREGNANCY IN SECOND TRIMESTER: ICD-10-CM

## 2022-04-18 LAB
GESTATIONAL GTT 1 HR POST DOSE: 180 MG/DL (ref 60–179)
GESTATIONAL GTT 2 HR POST DOSE: 159 MG/DL (ref 60–154)
GESTATIONAL GTT 3 HR POST DOSE: 126 MG/DL (ref 60–139)
GLUCOSE P FAST SERPL-MCNC: 80 MG/DL (ref 60–94)

## 2022-04-18 PROCEDURE — 36415 COLL VENOUS BLD VENIPUNCTURE: CPT

## 2022-04-18 PROCEDURE — 82951 GLUCOSE TOLERANCE TEST (GTT): CPT

## 2022-04-18 PROCEDURE — 82952 GTT-ADDED SAMPLES: CPT

## 2022-04-19 ENCOUNTER — VIRTUAL VISIT (OUTPATIENT)
Dept: EDUCATION SERVICES | Facility: CLINIC | Age: 32
End: 2022-04-19
Attending: ADVANCED PRACTICE MIDWIFE
Payer: COMMERCIAL

## 2022-04-19 DIAGNOSIS — O24.410 DIET CONTROLLED GESTATIONAL DIABETES MELLITUS (GDM) IN THIRD TRIMESTER: ICD-10-CM

## 2022-04-19 NOTE — PROGRESS NOTES
Diabetes education contact:    Attempted to reach pt X2, left message that scheduling will reach out again or she can call scheduling back,left scheduling number.    Maya Bajwa RD,[facility]

## 2022-04-19 NOTE — LETTER
4/19/2022         RE: Jaqui Guzman  [address] [place] [address]        Dear Colleague,    Thank you for referring your patient, Jaqui Guzman, to the [facility]. Please see a copy of my visit note below.    Diabetes education contact:    Attempted to reach pt X2, left message that scheduling will reach out again or she can call scheduling back,left scheduling number.    Maya Bajwa RD,[facility]

## 2022-04-20 PROBLEM — O09.93 SUPERVISION OF HIGH RISK PREGNANCY IN THIRD TRIMESTER: Status: ACTIVE | Noted: 2022-04-20

## 2022-04-27 ENCOUNTER — PRENATAL OFFICE VISIT (OUTPATIENT)
Dept: OBGYN | Facility: CLINIC | Age: 32
End: 2022-04-27
Payer: COMMERCIAL

## 2022-04-27 VITALS
SYSTOLIC BLOOD PRESSURE: 110 MMHG | DIASTOLIC BLOOD PRESSURE: 76 MMHG | HEART RATE: 92 BPM | BODY MASS INDEX: 31.59 KG/M2 | WEIGHT: 167.19 LBS

## 2022-04-27 DIAGNOSIS — Z86.718 PERSONAL HISTORY OF DVT (DEEP VEIN THROMBOSIS): ICD-10-CM

## 2022-04-27 DIAGNOSIS — O09.93 HIGH-RISK PREGNANCY IN THIRD TRIMESTER: Primary | ICD-10-CM

## 2022-04-27 DIAGNOSIS — O24.410 DIET CONTROLLED GESTATIONAL DIABETES MELLITUS (GDM) IN THIRD TRIMESTER: ICD-10-CM

## 2022-04-27 PROCEDURE — 59425 ANTEPARTUM CARE ONLY: CPT | Performed by: ADVANCED PRACTICE MIDWIFE

## 2022-04-27 PROCEDURE — 99207 PR PRENATAL VISIT: CPT | Performed by: ADVANCED PRACTICE MIDWIFE

## 2022-04-27 NOTE — PROGRESS NOTES
Feels ok.  Complaining of back pain at night.   Visit completed with .   Fetal movement: positive  Denies vaginal bleeding/lof, no contractions  Warning signs reviewed  Labor signs and symptoms discussed, aware of numbers to call  Reports that she is now taking the lovenox.   Has not had contact with [facility] though they have made multiple attempts to contact her.    We discussed the [facility] recommendation for induction at 39 weeks.  She is currently refusing this. . She wants the baby to come on its own.  Reviewed her current risks and why induction is recommended.         Return to clinic 1 week    Simona Wagoner, LILY, CNM

## 2022-05-16 ENCOUNTER — TELEPHONE (OUTPATIENT)
Dept: OBGYN | Facility: CLINIC | Age: 32
End: 2022-05-16
Payer: COMMERCIAL

## 2022-05-16 NOTE — TELEPHONE ENCOUNTER
Received a call from Dr. Echevarria who states pt needs a PA on her Lovenox that he prescribed upon discharge from the [facility].  Prescription is as below from the discharge note from Dr. Echevarria from today, 5/16:    enoxaparin (LOVENOX) 80 mg/0.8 mL SubQ injection Inject 0.7 mL (70 mg) under the skin every 12 (twelve) hours.  Qty: 63 mL, Refills: 0     Routing to the [facility] [place] to run a PA.    Sydnie Zhao RN

## 2022-05-19 RX ORDER — ENOXAPARIN SODIUM 100 MG/ML
INJECTION SUBCUTANEOUS
COMMUNITY
Start: 2022-05-16

## 2022-05-19 NOTE — TELEPHONE ENCOUNTER
PA Initiation    Medication: enoxaparin 40 MG/0.4ML syringe  Insurance Company: [employer] - Phone [phone] Fax [phone]  Pharmacy Filling the Rx: [address]  Filling Pharmacy Phone:    Filling Pharmacy Fax:    Start Date: 5/19/2022    Central Prior Authorization Team   Phone: [phone]

## 2022-05-19 NOTE — TELEPHONE ENCOUNTER
Prior Authorization Approval    Authorization Effective Date: 2/19/2022  Authorization Expiration Date: 5/19/2023  Medication: enoxaparin 40 MG/0.4ML syringe  Approved Dose/Quantity: 60 syringes/30 DS  Reference #:     Insurance Company: [employer] - Phone [phone] Fax [phone]  Expected CoPay:       CoPay Card Available:      Foundation Assistance Needed:    Which Pharmacy is filling the prescription (Not needed for infusion/clinic administered): [address]  Pharmacy Notified: Yes  Patient Notified: Comment:  Pharmacy will notify patient.

## 2022-05-19 NOTE — TELEPHONE ENCOUNTER
Please add a prescription to chart, so there is a record of this medication on the chart.  Please also include the provider's name and NPI number/ address. Please also include the filling pharmacy's  name and phone number, so we can inform the pharmacy if pa gets approved. Please route encounter back to the pa team.

## 2022-05-19 NOTE — TELEPHONE ENCOUNTER
enoxaparin ANTICOAGULANT (LOVENOX) 80 MG/0.8ML syringe is now reconciled on pt's current med list in Epic.    Dr. Leonardo Echevarria, NPI: [id number], prescribed this for patient upon discharge from the [facility].    He sent this prescription to the [facility] pharmacy.    Routing back to PA team.    Sydnie Zhao RN

## 2022-05-20 ENCOUNTER — APPOINTMENT (OUTPATIENT)
Dept: INTERPRETER SERVICES | Facility: CLINIC | Age: 32
End: 2022-05-20
Payer: COMMERCIAL

## 2022-09-28 NOTE — PROGRESS NOTES
She reports feeling reassuring daily fetal activity and will continue to record.  She gained 2 lbs since her last visit and denies any fluid leakage or regular uterine contractions.  She has not picked up her Lovenox syringes yet so was advised to do this today since she needs to take this daily due to her hx of a DVT during her third pregnancy.  She declines the MQS, flu vaccine, and COVID-19 booster even though the latter two were strongly advised.  She states that she already met with [facility] for a one-time visit - report is pending.   Unknown if ever smoked

## 2022-11-01 ENCOUNTER — IMMUNIZATION (OUTPATIENT)
Dept: NURSING | Facility: CLINIC | Age: 32
End: 2022-11-01
Payer: COMMERCIAL

## 2022-11-01 PROCEDURE — 90686 IIV4 VACC NO PRSV 0.5 ML IM: CPT

## 2022-11-01 PROCEDURE — 90471 IMMUNIZATION ADMIN: CPT

## 2022-11-01 PROCEDURE — 91313 COVID-19,PF,MODERNA BIVALENT: CPT

## 2022-11-01 PROCEDURE — 0134A COVID-19,PF,MODERNA BIVALENT: CPT

## 2023-04-23 ENCOUNTER — HEALTH MAINTENANCE LETTER (OUTPATIENT)
Age: 33
End: 2023-04-23

## 2024-03-08 ENCOUNTER — OFFICE VISIT (OUTPATIENT)
Dept: OBGYN | Facility: CLINIC | Age: 34
End: 2024-03-08
Payer: COMMERCIAL

## 2024-03-08 ENCOUNTER — TELEPHONE (OUTPATIENT)
Dept: OBGYN | Facility: CLINIC | Age: 34
End: 2024-03-08
Payer: COMMERCIAL

## 2024-03-08 ENCOUNTER — NURSE TRIAGE (OUTPATIENT)
Dept: NURSING | Facility: CLINIC | Age: 34
End: 2024-03-08
Payer: COMMERCIAL

## 2024-03-08 ENCOUNTER — TELEPHONE (OUTPATIENT)
Dept: OBGYN | Facility: CLINIC | Age: 34
End: 2024-03-08

## 2024-03-08 VITALS
DIASTOLIC BLOOD PRESSURE: 66 MMHG | BODY MASS INDEX: 32.33 KG/M2 | WEIGHT: 171.1 LBS | OXYGEN SATURATION: 96 % | HEART RATE: 82 BPM | SYSTOLIC BLOOD PRESSURE: 104 MMHG

## 2024-03-08 DIAGNOSIS — R11.0 NAUSEA: ICD-10-CM

## 2024-03-08 DIAGNOSIS — O20.0 THREATENED ABORTION: ICD-10-CM

## 2024-03-08 DIAGNOSIS — O20.9 BLEEDING IN EARLY PREGNANCY: Primary | ICD-10-CM

## 2024-03-08 DIAGNOSIS — R31.9 HEMATURIA, UNSPECIFIED TYPE: ICD-10-CM

## 2024-03-08 LAB
ALBUMIN UR-MCNC: 10 MG/DL
APPEARANCE UR: ABNORMAL
BACTERIA #/AREA URNS HPF: ABNORMAL /HPF
BILIRUB UR QL STRIP: NEGATIVE
COLOR UR AUTO: ABNORMAL
GLUCOSE UR STRIP-MCNC: NEGATIVE MG/DL
HCG INTACT+B SERPL-ACNC: 25 MIU/ML
HGB UR QL STRIP: ABNORMAL
KETONES UR STRIP-MCNC: NEGATIVE MG/DL
LEUKOCYTE ESTERASE UR QL STRIP: ABNORMAL
NITRATE UR QL: NEGATIVE
PH UR STRIP: 6 [PH] (ref 5–7)
RBC #/AREA URNS AUTO: >100 /HPF
SKIP: ABNORMAL
SP GR UR STRIP: 1.02 (ref 1–1.03)
SQUAMOUS #/AREA URNS AUTO: ABNORMAL /LPF
UROBILINOGEN UR STRIP-MCNC: NORMAL MG/DL
WBC #/AREA URNS AUTO: ABNORMAL /HPF

## 2024-03-08 PROCEDURE — 36415 COLL VENOUS BLD VENIPUNCTURE: CPT

## 2024-03-08 PROCEDURE — 81001 URINALYSIS AUTO W/SCOPE: CPT

## 2024-03-08 PROCEDURE — 87086 URINE CULTURE/COLONY COUNT: CPT

## 2024-03-08 PROCEDURE — 84702 CHORIONIC GONADOTROPIN TEST: CPT

## 2024-03-08 PROCEDURE — 99214 OFFICE O/P EST MOD 30 MIN: CPT

## 2024-03-08 PROCEDURE — 87088 URINE BACTERIA CULTURE: CPT

## 2024-03-08 RX ORDER — CHOLECALCIFEROL (VITAMIN D3) 25 MCG
1 TABLET,CHEWABLE ORAL DAILY
Qty: 90 CAPSULE | Refills: 3 | Status: SHIPPED | OUTPATIENT
Start: 2024-03-08

## 2024-03-08 RX ORDER — PYRIDOXINE HCL (VITAMIN B6) 25 MG
25 TABLET ORAL 3 TIMES DAILY
Qty: 270 TABLET | Refills: 3 | Status: SHIPPED | OUTPATIENT
Start: 2024-03-08 | End: 2025-03-08

## 2024-03-08 NOTE — TELEPHONE ENCOUNTER
Patient is currently pregnant and last night went to ER and had a blood test and is pregnant.  Today is having cramping in abdomen.  Patient has not been seen for pregnancy.  Patient is bleeding vaginally.  Patient went to [facility] yesterday and was told to follow up with OB MD.  [facility] phoned  for [place] and call got disconnected.  Patient is not bleeding heavily.  Patient is calling to schedule her first ob visit.      Reason for Disposition   [1] Constant abdominal pain AND [2] present > 2 hours    Additional Information   Negative: Shock suspected (e.g., cold/pale/clammy skin, too weak to stand, low BP, rapid pulse)   Negative: Difficult to awaken or acting confused (e.g., disoriented, slurred speech)   Negative: Passed out (i.e., lost consciousness, collapsed and was not responding)   Negative: Sounds like a life-threatening emergency to the triager   Negative: SEVERE abdominal pain   Negative: SEVERE vaginal bleeding (e.g., soaking 2 pads per hour or large blood clots and present 2 or more hours)   Negative: SEVERE dizziness (e.g., unable to stand, requires support to walk, feels like passing out)   Negative: [1] MODERATE vaginal bleeding (e.g., soaking 1 pad per hour; clots) AND [2] present > 6 hours   Negative: [1] MODERATE vaginal bleeding (e.g., soaking 1 pad per hour; clots) AND [2] pregnant > 12 weeks   Negative: Passed tissue (e.g., gray-white)   Negative: Shoulder pain   Negative: Pale skin (pallor) of new-onset or worsening   Negative: Patient sounds very sick or weak to the triager    Protocols used: Pregnancy - Vaginal Bleeding Less Than 20 Weeks [facility]-

## 2024-03-08 NOTE — PROGRESS NOTES
Subjective:  Jaqui is a 33 year old   is here today with phone  with the following concerns:     Threatened : she went to ED on 3/7/24 for vaginal bleeding and lower abdominal cramping and pain (mild). She reports she is 4wks pregnant and has positive home UPT. No US was done in ED. She reports she found blood in her urine and her UA w/ cx reflex ordered. Does not appear culture is currently running. This morning, she woke up with red blood that had filled her pad and she is having some cramping. She is worried she is having a miscarriage and reports she has never experienced anything like this with previous pregnancies.   Hematuria: reports blood in urine yesterday and today. Denies any s/sx of UTI and does not feel she has a UTI.  Nausea: she would like prescription for nausea management she currently has     hCG quants  3/7/24: 20  3: 25     ROS: Pertinent ROS as above.     Medical history                   OB History    Para Term  AB Living   5 4 4 0 0 4   SAB IAB Ectopic Multiple Live Births      0 0 0 0 4          # Outcome Date GA Lbr Christoph/2nd Weight Sex Delivery Anes PTL Lv   5                      4 Term 18 38w5d   3.43 kg (7 lb 9 oz) M    N DAVID   3 Term 13 38w1d   3.062 kg (6 lb 12 oz) F      DAVID      Apgar1: 9  Apgar5: 9   2 Term 08/14/10 37w4d   2.801 kg (6 lb 2.8 oz) M      DAVID      Apgar1: 9  Apgar5: 9   1 Term                DAVID      Past Medical History        Past Medical History:   Diagnosis Date    DVT (deep vein thrombosis) in pregnancy 2015     right leg         Past Surgical History         Past Surgical History:   Procedure Laterality Date    APPENDECTOMY               ALL/Meds: Her medication and allergy histories were reviewed and are documented in their appropriate chart areas.     SH: Reviewed and documented in the appropriate area of the chart.  FH:  Her family history is reviewed and updated in the  chart, today.  PMH: Her past medical, surgical, and obstetric histories were reviewed and updated today in the appropriate chart areas.     Objective:  PE: There were no vitals taken for this visit.  There is no height or weight on file to calculate BMI.     Pertinent Physical exam findings:     General Appearance:  healthy, alert, active, no distress   Pelvic:       - Ext: Vulva and perineum are normal without lesion, mass or discharge        - Urethra: normal without discharge or scarring        - Bladder: no tenderness, no masses       - Vagina: blood in vault       - Cervix: unable to visualize due to significant blood in vaginal vault    A/P:  Jaqui Guzman is a 33 year old  here today with the following concerns:  (O20.9) Bleeding in early pregnancy  (primary encounter diagnosis)  Comment: Repeat quants to ensure pregnancy is progressing normally. May consider US at later time if symptoms persist or new symptoms develop. Warning signs discussed with patient and advised she return to ED if she has significant pain or significant increase in bleeding or clots. Informed of reasons we will not obtain US today. Informed of possibility of miscarriage and discussed that there is no way to definitively tell her right now what is occurring. Also discussed s/sx of ectopic.  Plan: Urine Microscopic Exam, Urine Culture, Prenatal        Vit-Fe Sulfate-FA-DHA (PRENATAL VITAMIN/MIN         +DHA) 27-0.8-200 MG CAPS, hCG quant [id number]           (R31.9) Hematuria, unspecified type  Comment: Informed that this is most likely blood from the uterus and not blood in the urine due to lack of symptoms and bleeding currently occurring  Plan: Urine Microscopic Exam, Urine Culture          (R11.0) Nausea  Plan: pyridOXINE (VITAMIN B6) 25 MG tablet TID,         doxylamine (UNISOM) 25 MG TABS tablet at night            LILY Davison CNP

## 2024-03-08 NOTE — TELEPHONE ENCOUNTER
Caller reporting the following red-flag symptom(s): pregnant with pain and bleeding    Per the system red-flag symptom policy, patient was instructed to:  speak with a Registered Nurse    Action:  Patient warm transferred to a Registered Nurse

## 2024-03-08 NOTE — TELEPHONE ENCOUNTER
LMP approximately  2/05/2024 -4w4d     Pt calling with [employer] .   Reports increased vaginal bleeding in early pregnancy.    Calling requesting to be seen in clinic today     - [facility] ED last night for bleeding in early pregnancy.  Per patient ED did urine and blood test was diagnosed with an infection- but not prescribed any antibiotics?  When this writer asked for clarification patient states they don't know if she has an infection and didn't know what else to do so they told her to call and follow up with ob/gyn.  States no CT or Ultrasound completed in ED.     LMP 2/05/2024 -4w4d wearing a pad d/t increased bleeding, denies several vaginal bleeding, some mild low mid abdominal cramping, no history of ectopic, this writer unable to see records from [place] in [facility]. Pt asked several times to be seen today.     Advised ED precautions & scheduled patient to be seen today.    Routing to provider scheduled with today as FYI.

## 2024-03-10 LAB
BACTERIA UR CULT: ABNORMAL
BACTERIA UR CULT: ABNORMAL

## 2024-03-12 ENCOUNTER — LAB (OUTPATIENT)
Dept: LAB | Facility: CLINIC | Age: 34
End: 2024-03-12
Payer: COMMERCIAL

## 2024-03-12 DIAGNOSIS — O20.9 BLEEDING IN EARLY PREGNANCY: ICD-10-CM

## 2024-03-12 DIAGNOSIS — N93.9 ABNORMAL UTERINE BLEEDING: Primary | ICD-10-CM

## 2024-03-12 LAB — HCG INTACT+B SERPL-ACNC: 29 MIU/ML

## 2024-03-12 PROCEDURE — 36415 COLL VENOUS BLD VENIPUNCTURE: CPT

## 2024-03-12 PROCEDURE — 84702 CHORIONIC GONADOTROPIN TEST: CPT

## 2024-03-18 ENCOUNTER — ANCILLARY PROCEDURE (OUTPATIENT)
Dept: ULTRASOUND IMAGING | Facility: CLINIC | Age: 34
End: 2024-03-18
Payer: COMMERCIAL

## 2024-03-18 DIAGNOSIS — N93.9 ABNORMAL UTERINE BLEEDING: ICD-10-CM

## 2024-03-18 PROCEDURE — 76801 OB US < 14 WKS SINGLE FETUS: CPT

## 2024-03-18 PROCEDURE — 76817 TRANSVAGINAL US OBSTETRIC: CPT

## 2024-04-27 ENCOUNTER — HEALTH MAINTENANCE LETTER (OUTPATIENT)
Age: 34
End: 2024-04-27

## 2024-11-25 ENCOUNTER — LAB (OUTPATIENT)
Dept: LAB | Facility: CLINIC | Age: 34
End: 2024-11-25

## 2024-11-25 ENCOUNTER — TELEPHONE (OUTPATIENT)
Dept: OBGYN | Facility: CLINIC | Age: 34
End: 2024-11-25

## 2024-11-25 DIAGNOSIS — O20.9 BLEEDING IN EARLY PREGNANCY: ICD-10-CM

## 2024-11-25 LAB — HCG INTACT+B SERPL-ACNC: 2549 MIU/ML

## 2024-11-25 PROCEDURE — 84702 CHORIONIC GONADOTROPIN TEST: CPT

## 2024-11-25 PROCEDURE — 36415 COLL VENOUS BLD VENIPUNCTURE: CPT

## 2024-11-25 NOTE — TELEPHONE ENCOUNTER
"Pt last seen 3/8/2024 for bleeding in pregnancy, per 3/18 US: \"It typically needs to be 2,000-3,000 to see on ultrasound. Based on how your hCG is going, it does not appear to be a very early intrauterine pregnancy so we need to keep monitoring weekly hCG levels to see how they progress. It is possible that you had a failed intrauterine pregnancy and it is resolving. Please repeat an hCG today or tomorrow. \"    Pt did not do any follow up but states this is a new pregnancy. Pt on phone with .    LMP 10/10/2024, currently 6w4d but states she had positive test while     Had multiple positive HPTs    Pt needed to wear pad 3 days ago, bleeding decreased yesterday afternoon, light spotting dark red in color    Pt denies any cramping or pain.     RN assisted in scheduling HCGs and gave ED bleeding precautions.     Patient verbalized understanding and agreed to plan.     Vibha Munroe RN on 11/25/2024 at 11:36 AM            "

## 2024-11-25 NOTE — TELEPHONE ENCOUNTER
Using [employer] , calling patient back re: message below.     Unable to reach patient via phone. RN left a message and instructed patient to call the clinic at [phone].     thinks she is 14 weeks but unsure because she had random periods and when she had a period in October she also had a positive pregnancy test so patient is not sure, last night she did have a drop of blood while going to the bathroom but said it had stopped after that and is some dark brown but no bleeding.  Patient wants to schedule appointment.     Arlin SANCHEZ RN -  OB/GYN group

## 2024-11-25 NOTE — TELEPHONE ENCOUNTER
Health Call Center    Phone Message    May a detailed message be left on voicemail: yes     Reason for Call: Symptoms or Concerns     If patient has red-flag symptoms, warm transfer to triage line    Current symptom or concern: patient is calling she states she thinks she is 14 weeks but unsure because she had random periods and when she had a period in October she also had a positive pregnancy test so patient is not sure, last night she did have a drop of blood while going to the bathroom but said it had stopped after that and is some dark brown but no bleeding. Patient is wanting to schedule an appt to come in today to just hear the heart beat but writer is unsure how nurses would like to schedule her appointments due to her not being seen for the pregnancy yet so not sure with nurse ob intake etc. Patient was okay waiting until 8am to speak with a  nurse but wanted to put in a note as well of what was discussed.     Symptoms have been present for:  1 day(s)    Has patient previously been seen for this? No        Action Taken: Other: obgyn    Travel Screening: Not Applicable     Date of Service:

## 2025-05-11 ENCOUNTER — HEALTH MAINTENANCE LETTER (OUTPATIENT)
Age: 35
End: 2025-05-11